# Patient Record
Sex: MALE | Race: WHITE | NOT HISPANIC OR LATINO | Employment: OTHER | URBAN - METROPOLITAN AREA
[De-identification: names, ages, dates, MRNs, and addresses within clinical notes are randomized per-mention and may not be internally consistent; named-entity substitution may affect disease eponyms.]

---

## 2017-01-04 RX ORDER — PREGABALIN 100 MG/1
100 CAPSULE ORAL 3 TIMES DAILY
Qty: 270 CAPSULE | Refills: 1 | Status: SHIPPED | OUTPATIENT
Start: 2017-01-04 | End: 2017-04-11 | Stop reason: SDUPTHER

## 2017-01-04 NOTE — TELEPHONE ENCOUNTER
Medication Refill Request    Date of phone call: 1/3/17    Medication being requested: Lyrica 100 mg sig: 3xday  Qty: 270    Date of last visit: 11/21/16    Date of last refill: 9/20/16    COURTNEY up to date?: 11/18/16    Next Follow up?: 1/5/17    Any new pertinent information? (i.e, new medication allergies, new use of medications, change in patient's health or condition, non-compliance or inconsistency with prescribing agreement?): patient wants sent to Mail in order

## 2017-01-05 ENCOUNTER — OFFICE VISIT (OUTPATIENT)
Dept: PAIN MEDICINE | Facility: CLINIC | Age: 72
End: 2017-01-05

## 2017-01-05 VITALS
WEIGHT: 225 LBS | HEART RATE: 80 BPM | HEIGHT: 71 IN | BODY MASS INDEX: 31.5 KG/M2 | DIASTOLIC BLOOD PRESSURE: 91 MMHG | TEMPERATURE: 97.9 F | OXYGEN SATURATION: 96 % | RESPIRATION RATE: 16 BRPM | SYSTOLIC BLOOD PRESSURE: 144 MMHG

## 2017-01-05 DIAGNOSIS — G89.29 OTHER CHRONIC PAIN: Primary | ICD-10-CM

## 2017-01-05 DIAGNOSIS — M51.36 DEGENERATION OF INTERVERTEBRAL DISC OF LUMBAR REGION: ICD-10-CM

## 2017-01-05 DIAGNOSIS — Z79.899 ENCOUNTER FOR LONG-TERM CURRENT USE OF HIGH RISK MEDICATION: ICD-10-CM

## 2017-01-05 DIAGNOSIS — M48.061 SPINAL STENOSIS OF LUMBAR REGION: ICD-10-CM

## 2017-01-05 DIAGNOSIS — Z97.8 PRESENCE OF INTRATHECAL PUMP: ICD-10-CM

## 2017-01-05 PROCEDURE — 99214 OFFICE O/P EST MOD 30 MIN: CPT | Performed by: NURSE PRACTITIONER

## 2017-01-05 PROCEDURE — 62369 ANAL SP INF PMP W/REPRG&FILL: CPT | Performed by: NURSE PRACTITIONER

## 2017-01-05 NOTE — PROGRESS NOTES
CHIEF COMPLAINT  Since 11/21/16 office visit/pump refill,Pt states LBP has increased moderately with cold weather.  Pt was hospitalized at [facility] from 12/24/16 to 12/27/16 due to being unresponsive on 12/24/16;Exams reportedly ruled out M.I.,Stroke,blood clots-no Dx confirmed    Subjective   Tay CELIA Geigre is a 71 y.o. male  who presents to the office for follow-up.He has a history of chronic low back pain. He is here today for refill of IT Prialt.  He has been stable with his current regimen of Prialt for many years.  He does sparingly supplement with hydrocodone, this is usually only a few times each week.      The patient was hospitalized recently with acute unresponsive episode and confusion/altered mental status.  MI, acute CVA, seizures, infection ruled out.  He has improved, however the confusion has continued.         I have reviewed the hospital course at [facility] 12-24-16 - 12-27-16  Hospital Course:  71-year-old male with a past medical history of chronic pain on Ziconotide intrathecal pump for chronic back pain, hypertension who presented on December 24 with complaints of change in mental status and increased confusion worse than his baseline. His hospital course is summarized as below.    AMS  - TIA vs post-ictal vs pain-pump (with Prialt) vs infectious  - The workup was negative and there was no acute findings that would explain his change in mental status. MRI did not show any acute changes. MRA with some vascular changes but most likely not responsible for his current issues (see above for details). He underwent an EEG which was unremarkable as well. Patient is back to near baseline although his wife does report he has chronic short term memory issues as well.     Vit B12 def: Level was 130's. Possibly could explain some other more chronic Issues. Doubt this is the problem with acute issue as it resolved quickly. Starting on IM vit b12 injections and recommend follow up with pcp  to continue at home.      Acute on chronic kidney disease: Patient's creatinine on admission was 1.5. Per the wife is been around 1.4 however is currently 1.1. Is it possible that this was related to medication setting of renal dysfunction now improving? Recommend output follow up.      HTN - uncontrolled at times  - Blood pressure still not well controlled. Increased Norvasc to 10 milligrams. and increased hydralazine to 50 milligrams t.i.d.     Chronic back pain - has pain pump with Prialt - no changes acutely. Outpatient follow-up with his primary pain management physician.     HLD - lipids pending  - defer to pcp.     I spent greater than 30 minutes in the preparation of this discharge summary.  Signed:  Dano Sheppard MD  12/27/2016  2:10 PM        Back Pain   This is a chronic problem. The current episode started more than 1 year ago. The problem occurs constantly. The problem is unchanged. The pain is present in the lumbar spine. The quality of the pain is described as aching. The pain radiates to the left foot. The pain is at a severity of 8/10. The pain is the same all the time. Exacerbated by: cold weather, prolonged walking. Pertinent negatives include no bladder incontinence, bowel incontinence, chest pain, dysuria, headaches or numbness. Risk factors include lack of exercise and sedentary lifestyle. Treatments tried: IT pump with Prialt, Lyrica. The treatment provided mild relief.        PEG Assessment   What number best describes your pain on average in the past week?6  What number best describes how, during the past week, pain has interfered with your enjoyment of life?9  What number best describes how, during the past week, pain has interfered with your general activity?  9      The following portions of the patient's history were reviewed and updated as appropriate: allergies, current medications, past family history, past medical history, past social history, past surgical  "history and problem list.    Review of Systems   Constitutional: Positive for activity change (decreased). Negative for appetite change.   Eyes: Positive for visual disturbance.   Respiratory: Negative for chest tightness and shortness of breath.    Cardiovascular: Negative for chest pain.   Gastrointestinal: Negative for bowel incontinence, constipation, diarrhea and nausea.   Genitourinary: Negative for bladder incontinence, difficulty urinating and dysuria.   Musculoskeletal: Positive for back pain.   Neurological: Negative for dizziness, light-headedness, numbness and headaches.   Psychiatric/Behavioral: Positive for confusion and decreased concentration (memory deficit).       Vitals:    01/05/17 1141   BP: 144/91   Pulse: 80   Resp: 16   Temp: 97.9 °F (36.6 °C)   SpO2: 96%   Weight: 225 lb (102 kg)   Height: 71\" (180.3 cm)   PainSc: 8  Comment: LBP from center to L side ranges from1-8/10   PainLoc: Back         Objective   Physical Exam   Constitutional: He is oriented to person, place, and time. He appears well-developed and well-nourished.   HENT:   Head: Normocephalic.   Eyes: Conjunctivae are normal.   Cardiovascular: Normal rate.    Pulmonary/Chest: Effort normal.   Neurological: He is alert and oriented to person, place, and time.   Skin: Skin is warm and dry.   Psychiatric: He has a normal mood and affect. His behavior is normal. Judgment and thought content normal.   Nursing note and vitals reviewed.      Intrathecal pump was interrogated in office today. Results are in chart.      The patient is currently at a dose of 10.2 mcg of Prialt per day.       Under sterile technique, the pump was accessed using a [employer] proprietary refill kit. The volume was withdrawn from the pump. The expected residual volume of the pump was 4.1 ml. The actual residual volume of the pump was 5.3 ml.      The pump was then refilled with 20 ml of Prialt at a concentration of 25 mcg/ml.  Dose was decreased to " 9mcg/day      Patient tolerated procedure well. Minimal bleeding was noted. Pump site remains intact with no evidence of erythema or drainage.      Patient will return for pump refill when due or sooner if needed.     Assessment/Plan   Tay was seen today for back pain.    Diagnoses and all orders for this visit:    Other chronic pain    Presence of intrathecal pump    Spinal stenosis of lumbar region    Degeneration of intervertebral disc of lumbar region    Encounter for long-term current use of high risk medication        --- Decrease Prialt by 10% today to 9 mcg/day. If confusion improves consider Prialt as culprit for mental status changes.  If not improved, consider reducing dose by another 10% to further rule out Prialt as cause for confusion.  If symptoms remain unchanged at that point, it would be unlikely that the Prialt is the cause of the confusion.    --- Check on status of pump dye study  --- does not need refill today of hydrocodone. Patient appears stable with current regimen. No adverse effects. Regarding continuation of opioids, there is no evidence of aberrant behavior or any red flags.  The patient continues with appropriate response to opioid therapy. ADL's remain intact by self.   --- needs UDS/ODT next office visit  --- Follow-up 7-10 days         COURTNEY REPORT    As part of the patient's treatment plan, I am prescribing controlled substances. The patient has been made aware of appropriate use of such medications, including potential risk of somnolence, limited ability to drive and/or work safely, and the potential for dependence or overdose. It has also bee made clear that these medications are for use by this patient only, without concomitant use of alcohol or other substances unless prescribed.     Patient has completed prescribing agreement detailing terms of continued prescribing of controlled substances, including monitoring COURTNEY reports, urine drug screening, and pill counts if  necessary. The patient is aware that inappropriate use will results in cessation of prescribing such medications.    COURTNEY report has been reviewed and scanned into the patient's chart.    Date of last COURTNEY : 1-4-2017    History and physical exam exhibit continued safe and appropriate use of controlled substances.      EMR Dragon/Transcription disclaimer:   Much of this encounter note is an electronic transcription/translation of spoken language to printed text. The electronic translation of spoken language may permit erroneous, or at times, nonsensical words or phrases to be inadvertently transcribed; Although I have reviewed the note for such errors, some may still exist.

## 2017-01-05 NOTE — MR AVS SNAPSHOT
Tay Geiger   1/5/2017 11:00 AM   Office Visit    Dept Phone:  [phone]   Encounter #:  [id number]    Provider:  BENIGNO Haji   Department:  [facility] PAIN MANAGEMENT                Your Full Care Plan              Your Updated Medication List          This list is accurate as of: 1/5/17 12:24 PM.  Always use your most recent med list.                amitriptyline 50 MG tablet   Commonly known as:  ELAVIL       amLODIPine 5 MG tablet   Commonly known as:  NORVASC       aspirin 81 MG tablet       cetirizine 10 MG tablet   Commonly known as:  zyrTEC       citalopram 20 MG tablet   Commonly known as:  CeleXA   TAKE ONE TABLET BY MOUTH DAILY AS DIRECTED       DULoxetine 60 MG capsule   Commonly known as:  CYMBALTA       hydrALAZINE 25 MG tablet   Commonly known as:  APRESOLINE       HYDROcodone-acetaminophen  MG per tablet   Commonly known as:  NORCO   Take 1 tablet by mouth Daily As Needed for moderate pain (4-6).       Melatonin 3 MG capsule       metoclopramide 10 MG tablet   Commonly known as:  REGLAN       NUVIGIL 250 MG tablet   Generic drug:  Armodafinil       omeprazole 20 MG capsule   Commonly known as:  priLOSEC       polyethylene glycol powder   Commonly known as:  MIRALAX       pregabalin 100 MG capsule   Commonly known as:  LYRICA   Take 1 capsule by mouth 3 (Three) Times a Day.       simvastatin 40 MG tablet   Commonly known as:  ZOCOR       tiZANidine 4 MG tablet   Commonly known as:  ZANAFLEX   Take 2 tablets by mouth 4 (Four) Times a Day As Needed (#720 3 month supply). For muscle spasms       * VITAMIN D PO       * Vitamin D 2000 UNITS capsule       ziconotide (PF) 500 MCG/20ML injection   Commonly known as:  PRIALT       * Notice:  This list has 2 medication(s) that are the same as other medications prescribed for you. Read the directions carefully, and ask your doctor or other care provider to review them with you.            You  Were Diagnosed With        Codes Comments    Other chronic pain    -  Primary ICD-10-CM: G89.29  ICD-9-CM: 338.29     Presence of intrathecal pump     ICD-10-CM: Z96.89  ICD-9-CM: V45.89     Spinal stenosis of lumbar region     ICD-10-CM: M48.06  ICD-9-CM: 724.02     Degeneration of intervertebral disc of lumbar region     ICD-10-CM: M51.36  ICD-9-CM: 722.52     Encounter for long-term current use of high risk medication     ICD-10-CM: Z79.899  ICD-9-CM: V58.69       Instructions     None    Patient Instructions History      Upcoming Appointments     Visit Type Date Time Department    OFFICE VISIT 2017 11:00 AM [facility]    OFFICE VISIT 2017 12:40 PM [facility]      [employer] Signup     [facility] [employer] allows you to send messages to your doctor, view your test results, renew your prescriptions, schedule appointments, and more. To sign up, go to [employer] and click on the Sign Up Now link in the New User? box. Enter your [employer] Activation Code exactly as it appears below along with the last four digits of your Social Security Number and your Date of Birth () to complete the sign-up process. If you do not sign up before the expiration date, you must request a new code.    Onevest Activation Code: [email or web address]  Expires: 2017 12:24 PM    If you have questions, you can email [email or web address] or call [phone] to talk to our One[employer] staff. Remember, Onevest is NOT to be used for urgent needs. For medical emergencies, dial 911.               Other Info from Your Visit           Your Appointments     2017 12:40 PM EST   Office Visit with BENIGNO Haji   [facility] (--)    [address], [address] [address] [phone]           Arrive 15 minutes prior to appointment. Arrive 30 minutes prior to your appointment. Bring insurance card, photo ID and copay.              Allergies   "   Lidocaine  Other (See Comments)    Morphine        Reason for Visit     Back Pain           Vital Signs     Blood Pressure Pulse Temperature Respirations Height Weight    144/91 80 97.9 °F (36.6 °C) 16 71\" (180.3 cm) 225 lb (102 kg)    Oxygen Saturation Body Mass Index Smoking Status             96% 31.38 kg/m2 Former Smoker         Problems and Diagnoses Noted     Chronic pain    Degeneration of lumbar or lumbosacral intervertebral disc    Presence of intrathecal pump    Narrowing of spinal canal    Encounter for long-term current use of high risk medication            "

## 2017-01-13 ENCOUNTER — OFFICE VISIT (OUTPATIENT)
Dept: PAIN MEDICINE | Facility: CLINIC | Age: 72
End: 2017-01-13

## 2017-01-13 VITALS
HEIGHT: 71 IN | RESPIRATION RATE: 16 BRPM | OXYGEN SATURATION: 96 % | WEIGHT: 223 LBS | TEMPERATURE: 98.2 F | BODY MASS INDEX: 31.22 KG/M2 | SYSTOLIC BLOOD PRESSURE: 182 MMHG | HEART RATE: 88 BPM | DIASTOLIC BLOOD PRESSURE: 112 MMHG

## 2017-01-13 DIAGNOSIS — Z97.8 PRESENCE OF INTRATHECAL PUMP: ICD-10-CM

## 2017-01-13 DIAGNOSIS — M48.061 SPINAL STENOSIS OF LUMBAR REGION: ICD-10-CM

## 2017-01-13 DIAGNOSIS — G89.29 OTHER CHRONIC PAIN: Primary | ICD-10-CM

## 2017-01-13 DIAGNOSIS — M51.36 DEGENERATION OF INTERVERTEBRAL DISC OF LUMBAR REGION: ICD-10-CM

## 2017-01-13 DIAGNOSIS — M51.26 DISPLACEMENT OF LUMBAR INTERVERTEBRAL DISC WITHOUT MYELOPATHY: ICD-10-CM

## 2017-01-13 DIAGNOSIS — M54.16 LUMBAR RADICULOPATHY: ICD-10-CM

## 2017-01-13 PROCEDURE — 62368 ANALYZE SP INF PUMP W/REPROG: CPT | Performed by: NURSE PRACTITIONER

## 2017-01-13 PROCEDURE — 99213 OFFICE O/P EST LOW 20 MIN: CPT | Performed by: NURSE PRACTITIONER

## 2017-01-13 NOTE — MR AVS SNAPSHOT
Tay Geiger   1/13/2017 12:40 PM   Office Visit    Dept Phone:  [phone]   Encounter #:  [id number]    Provider:  BENIGNO Haji   Department:  [facility] PAIN MANAGEMENT                Your Full Care Plan              Your Updated Medication List          This list is accurate as of: 1/13/17  1:48 PM.  Always use your most recent med list.                amitriptyline 50 MG tablet   Commonly known as:  ELAVIL       amLODIPine 5 MG tablet   Commonly known as:  NORVASC       aspirin 81 MG tablet       cetirizine 10 MG tablet   Commonly known as:  zyrTEC       citalopram 20 MG tablet   Commonly known as:  CeleXA   TAKE ONE TABLET BY MOUTH DAILY AS DIRECTED       DULoxetine 60 MG capsule   Commonly known as:  CYMBALTA       hydrALAZINE 25 MG tablet   Commonly known as:  APRESOLINE       HYDROcodone-acetaminophen  MG per tablet   Commonly known as:  NORCO   Take 1 tablet by mouth Daily As Needed for moderate pain (4-6).       Melatonin 3 MG capsule       metoclopramide 10 MG tablet   Commonly known as:  REGLAN       NUVIGIL 250 MG tablet   Generic drug:  Armodafinil       omeprazole 20 MG capsule   Commonly known as:  priLOSEC       polyethylene glycol powder   Commonly known as:  MIRALAX       pregabalin 100 MG capsule   Commonly known as:  LYRICA   Take 1 capsule by mouth 3 (Three) Times a Day.       simvastatin 40 MG tablet   Commonly known as:  ZOCOR       tiZANidine 4 MG tablet   Commonly known as:  ZANAFLEX   Take 2 tablets by mouth 4 (Four) Times a Day As Needed (#720 3 month supply). For muscle spasms       * VITAMIN D PO       * Vitamin D 2000 UNITS capsule       ziconotide (PF) 500 MCG/20ML injection   Commonly known as:  PRIALT       * Notice:  This list has 2 medication(s) that are the same as other medications prescribed for you. Read the directions carefully, and ask your doctor or other care provider to review them with you.            You  "Were Diagnosed With        Codes Comments    Other chronic pain    -  Primary ICD-10-CM: G89.29  ICD-9-CM: 338.29     Spinal stenosis of lumbar region     ICD-10-CM: M48.06  ICD-9-CM: 724.02     Displacement of lumbar intervertebral disc without myelopathy     ICD-10-CM: M51.26  ICD-9-CM: 722.10     Degeneration of intervertebral disc of lumbar region     ICD-10-CM: M51.36  ICD-9-CM: 722.52     Lumbar radiculopathy     ICD-10-CM: M54.16  ICD-9-CM: 724.4     Presence of intrathecal pump     ICD-10-CM: Z96.89  ICD-9-CM: V45.89       Instructions     None    Patient Instructions History      Upcoming Appointments     Visit Type Date Time Department    OFFICE VISIT 2017 12:40 PM [facility]      [employer] Signup     [facility] [employer] allows you to send messages to your doctor, view your test results, renew your prescriptions, schedule appointments, and more. To sign up, go to [employer] and click on the Sign Up Now link in the New User? box. Enter your [employer] Activation Code exactly as it appears below along with the last four digits of your Social Security Number and your Date of Birth () to complete the sign-up process. If you do not sign up before the expiration date, you must request a new code.    [employer] Activation Code: [email or web address]  Expires: 2017 12:24 PM    If you have questions, you can email [email or web address] or call [phone] to talk to our [employer] staff. Remember, [employer] is NOT to be used for urgent needs. For medical emergencies, dial 911.               Other Info from Your Visit           Allergies     Lidocaine  Other (See Comments)    Morphine        Reason for Visit     Back Pain           Vital Signs     Blood Pressure Pulse Temperature Respirations Height Weight    182/112 88 98.2 °F (36.8 °C) 16 71\" (180.3 cm) 223 lb (101 kg)    Oxygen Saturation Body Mass Index Smoking Status             96% 31.1 kg/m2 Former Smoker         Problems and " Diagnoses Noted     Chronic pain    Degeneration of lumbar or lumbosacral intervertebral disc    Bulging disc of backbone    Lumbar nerve root disorder    Presence of intrathecal pump    Narrowing of spinal canal

## 2017-01-13 NOTE — PROGRESS NOTES
CHIEF COMPLAINT  Pt reportedly has had increase in LBP since 1/5/17 office visit,and has been taking hydrocodone (2) BID over the last week.  Pt's level of confusion/memory deficit reportedly has not improved as well.    Subjective   Tay Geiger is a 71 y.o. male  who presents to the office for follow-up.He has a history of chronic low back pain.  At the previous office visit his IT Prialt dose was decreased second to confusion and hospitalization with altered mental status.  Since that time the patient reports increase in back pain, has been taking more breakthrough hydrocodone since that time, reports no improvement in confusion.  He will be seeing a neurologist on Monday.      BP high today, reports that he took his blood pressure medication late today.  Denies any chest pain, SOA, visual changes, HA.      Pump dye study has still not been authorized.      Back Pain   This is a chronic problem. The current episode started more than 1 year ago. The problem occurs constantly. The problem is unchanged. The pain is present in the lumbar spine. The quality of the pain is described as aching. The pain radiates to the left foot. The pain is at a severity of 7/10. The pain is the same all the time. Exacerbated by: cold weather, prolonged walking. Pertinent negatives include no bladder incontinence, bowel incontinence, chest pain, dysuria, headaches or numbness. Risk factors include lack of exercise and sedentary lifestyle. Treatments tried: IT pump with Prialt, Lyrica. The treatment provided mild relief.      PEG Assessment   What number best describes your pain on average in the past week?8  What number best describes how, during the past week, pain has interfered with your enjoyment of life?9  What number best describes how, during the past week, pain has interfered with your general activity?  9    The following portions of the patient's history were reviewed and updated as appropriate: allergies, current  "medications, past family history, past medical history, past social history, past surgical history and problem list.    Review of Systems   Constitutional: Negative for activity change and appetite change.   Respiratory: Negative for apnea and shortness of breath.    Cardiovascular: Negative for chest pain.   Gastrointestinal: Negative for bowel incontinence, constipation, diarrhea and nausea.   Genitourinary: Negative for bladder incontinence, difficulty urinating and dysuria.   Musculoskeletal: Positive for back pain.   Neurological: Negative for dizziness, numbness and headaches.   Psychiatric/Behavioral: Positive for confusion and sleep disturbance. Negative for suicidal ideas.     Vitals:    01/13/17 1253   BP: (!) 182/112   Pulse: 88   Resp: 16   Temp: 98.2 °F (36.8 °C)   SpO2: 96%   Weight: 223 lb (101 kg)   Height: 71\" (180.3 cm)   PainSc: 7  Comment: LBP ranges from 2-8/10   PainLoc: Back     Objective   Physical Exam   Constitutional: He is oriented to person, place, and time. He appears well-developed and well-nourished.   HENT:   Head: Normocephalic.   Eyes: Conjunctivae are normal.   Cardiovascular: Normal rate.    Pulmonary/Chest: Effort normal.   Neurological: He is alert and oriented to person, place, and time.   Skin: Skin is warm and dry.   Psychiatric: He has a normal mood and affect. His behavior is normal. Judgment and thought content normal.   Nursing note and vitals reviewed.    Assessment/Plan   Tay was seen today for back pain.    Diagnoses and all orders for this visit:    Other chronic pain    Spinal stenosis of lumbar region    Displacement of lumbar intervertebral disc without myelopathy    Degeneration of intervertebral disc of lumbar region    Lumbar radiculopathy    Presence of intrathecal pump      --- Decrease Prialt dose by 10% to 8 mcg /day.  If confusion improves consider Prialt as culprit for mental status changes.  If symptoms remain unchanged at that point, it would be " unlikely that the Prialt is the cause of the confusion.   --- does not need refill today of hydrocodone. Patient appears stable with current regimen. No adverse effects. Regarding continuation of opioids, there is no evidence of aberrant behavior or any red flags. The patient continues with appropriate response to opioid therapy. ADL's remain intact by self.   --- needs UDS/ODT next office visit  --- Follow-up 7-10 days         COURTNEY REPORT    As part of the patient's treatment plan, I am prescribing controlled substances. The patient has been made aware of appropriate use of such medications, including potential risk of somnolence, limited ability to drive and/or work safely, and the potential for dependence or overdose. It has also bee made clear that these medications are for use by this patient only, without concomitant use of alcohol or other substances unless prescribed.     Patient has completed prescribing agreement detailing terms of continued prescribing of controlled substances, including monitoring COURTNEY reports, urine drug screening, and pill counts if necessary. The patient is aware that inappropriate use will results in cessation of prescribing such medications.    COURTNEY report has been reviewed and scanned into the patient's chart.    Date of last COURTNEY : 1-    History and physical exam exhibit continued safe and appropriate use of controlled substances.    EMR Dragon/Transcription disclaimer:   Much of this encounter note is an electronic transcription/translation of spoken language to printed text. The electronic translation of spoken language may permit erroneous, or at times, nonsensical words or phrases to be inadvertently transcribed; Although I have reviewed the note for such errors, some may still exist.

## 2017-01-25 ENCOUNTER — OFFICE VISIT (OUTPATIENT)
Dept: PAIN MEDICINE | Facility: CLINIC | Age: 72
End: 2017-01-25

## 2017-01-25 ENCOUNTER — RESULTS ENCOUNTER (OUTPATIENT)
Dept: PAIN MEDICINE | Facility: CLINIC | Age: 72
End: 2017-01-25

## 2017-01-25 VITALS
DIASTOLIC BLOOD PRESSURE: 113 MMHG | WEIGHT: 230 LBS | HEART RATE: 76 BPM | RESPIRATION RATE: 18 BRPM | OXYGEN SATURATION: 97 % | TEMPERATURE: 97.6 F | SYSTOLIC BLOOD PRESSURE: 187 MMHG | BODY MASS INDEX: 32.2 KG/M2 | HEIGHT: 71 IN

## 2017-01-25 DIAGNOSIS — G89.29 OTHER CHRONIC PAIN: Primary | ICD-10-CM

## 2017-01-25 DIAGNOSIS — M48.061 SPINAL STENOSIS OF LUMBAR REGION: ICD-10-CM

## 2017-01-25 DIAGNOSIS — M51.36 DEGENERATION OF INTERVERTEBRAL DISC OF LUMBAR REGION: ICD-10-CM

## 2017-01-25 DIAGNOSIS — Z97.8 PRESENCE OF INTRATHECAL PUMP: ICD-10-CM

## 2017-01-25 DIAGNOSIS — G89.29 OTHER CHRONIC PAIN: ICD-10-CM

## 2017-01-25 PROCEDURE — 99214 OFFICE O/P EST MOD 30 MIN: CPT | Performed by: NURSE PRACTITIONER

## 2017-01-25 PROCEDURE — 62368 ANALYZE SP INF PUMP W/REPROG: CPT | Performed by: NURSE PRACTITIONER

## 2017-01-25 RX ORDER — HYDROCODONE BITARTRATE AND ACETAMINOPHEN 10; 325 MG/1; MG/1
1 TABLET ORAL DAILY PRN
Qty: 30 TABLET | Refills: 0 | Status: SHIPPED | OUTPATIENT
Start: 2017-01-25 | End: 2017-05-25 | Stop reason: SDUPTHER

## 2017-01-25 RX ORDER — CLOPIDOGREL BISULFATE 75 MG/1
75 TABLET ORAL DAILY
COMMUNITY

## 2017-01-25 NOTE — PROGRESS NOTES
CHIEF COMPLAINT  Back Pain, patient fell on Saturday and his wife states that his legs gave out on him.    Subjective   Tay Geiger is a 71 y.o. male  who presents to the office for follow-up.He has a history of chronic low back pain.    At the previous office visit his IT Prialt dose was decreased.  This was the second decrease since a recent hospitalization with altered mental status in attempt to rule out Prialt as culprit for these symptoms.  Since that time the patient reports increase in back pain, has been taking more breakthrough hydrocodone since that time, reports no improvement in confusion symptoms. He has also seen a neurologist since last visit.  Neurologist is now saying that he believes that he had a TIA, was started on Plavix.  He has also started B12 injections.      Prior to decreasing the prialt dose he had been stable with a regimen of Prialt 10 mcg/day. No adverse effects and pain was well controlled with this regimen.  He only required breakthrough medication on a very sparing basis.      BP high today. Denies any chest pain, SOA, visual changes, HA.     Back Pain   This is a chronic problem. The current episode started more than 1 year ago. The problem occurs constantly. The problem is unchanged. The pain is present in the lumbar spine. The quality of the pain is described as aching. The pain radiates to the left foot. The pain is at a severity of 7/10. The pain is the same all the time. Exacerbated by: cold weather, prolonged walking. Associated symptoms include weakness. Pertinent negatives include no bladder incontinence, bowel incontinence, chest pain, dysuria, fever, headaches or numbness. Risk factors include lack of exercise and sedentary lifestyle. Treatments tried: IT pump with Prialt, Lyrica. The treatment provided moderate relief.      PEG Assessment   What number best describes your pain on average in the past week?7  What number best describes how, during the past week, pain  "has interfered with your enjoyment of life?10  What number best describes how, during the past week, pain has interfered with your general activity?  10    The following portions of the patient's history were reviewed and updated as appropriate: allergies, current medications, past family history, past medical history, past social history, past surgical history and problem list.    Review of Systems   Constitutional: Negative for chills and fever.   Respiratory: Negative for shortness of breath.    Cardiovascular: Negative for chest pain.   Gastrointestinal: Negative for bowel incontinence, constipation, diarrhea, nausea and vomiting.   Genitourinary: Negative for bladder incontinence, difficulty urinating and dysuria.   Musculoskeletal: Positive for back pain.   Neurological: Positive for weakness. Negative for dizziness, light-headedness, numbness and headaches.   Psychiatric/Behavioral: Positive for confusion. Negative for hallucinations, self-injury, sleep disturbance and suicidal ideas. The patient is not nervous/anxious.        Vitals:    01/25/17 1029   BP: (!) 187/113   Pulse: 76   Resp: 18   Temp: 97.6 °F (36.4 °C)   SpO2: 97%   Weight: 230 lb (104 kg)   Height: 71\" (180.3 cm)   PainSc:   8   PainLoc: Back       Objective   Physical Exam   Constitutional: He is oriented to person, place, and time. He appears well-developed and well-nourished.   HENT:   Head: Normocephalic.   Eyes: Conjunctivae are normal.   Cardiovascular: Normal rate.    Pulmonary/Chest: Effort normal.   Neurological: He is alert and oriented to person, place, and time.   Skin: Skin is warm and dry.   Psychiatric: He has a normal mood and affect. His behavior is normal. Judgment and thought content normal.   Nursing note and vitals reviewed.      Assessment/Plan   Tay was seen today for back pain.    Diagnoses and all orders for this visit:    Other chronic pain    Spinal stenosis of lumbar region    Degeneration of intervertebral disc " of lumbar region    Presence of intrathecal pump    Other orders  -     HYDROcodone-acetaminophen (NORCO)  MG per tablet; Take 1 tablet by mouth Daily As Needed for moderate pain (4-6).      --- Increase Prialt back to 10 mcg/day  --- Must follow up with PCP in regards to HTN  --- Refill Hydrocodone.  Patient appears stable with current regimen. No adverse effects. Regarding continuation of opioids, there is no evidence of aberrant behavior or any red flags.  The patient continues with appropriate response to opioid therapy. ADL's remain intact by self.   --- Routine ODT in office today as part of monitoring requirements for controlled substances.  This specimen will be sent to [facility] laboratory for confirmation.   (LAST DOSE REPORTED 4 PM Yesterday)  --- Follow-up pump refill in 1 month          COURTNEY REPORT    As part of the patient's treatment plan, I am prescribing controlled substances. The patient has been made aware of appropriate use of such medications, including potential risk of somnolence, limited ability to drive and/or work safely, and the potential for dependence or overdose. It has also bee made clear that these medications are for use by this patient only, without concomitant use of alcohol or other substances unless prescribed.     Patient has completed prescribing agreement detailing terms of continued prescribing of controlled substances, including monitoring COURTNEY reports, urine drug screening, and pill counts if necessary. The patient is aware that inappropriate use will results in cessation of prescribing such medications.    COURTNEY report has been reviewed and scanned into the patient's chart.    Date of last COURTNEY : 1-    History and physical exam exhibit continued safe and appropriate use of controlled substances.    EMR Dragon/Transcription disclaimer:   Much of this encounter note is an electronic transcription/translation of spoken language to printed text. The  electronic translation of spoken language may permit erroneous, or at times, nonsensical words or phrases to be inadvertently transcribed; Although I have reviewed the note for such errors, some may still exist.

## 2017-01-25 NOTE — MR AVS SNAPSHOT
Tay Geiger   1/25/2017 10:20 AM   Office Visit    Dept Phone:  [phone]   Encounter #:  [id number]    Provider:  BENIGNO Haji   Department:  [facility] PAIN MANAGEMENT                Your Full Care Plan              Where to Get Your Medications      You can get these medications from any pharmacy     Bring a paper prescription for each of these medications     HYDROcodone-acetaminophen  MG per tablet            Your Updated Medication List          This list is accurate as of: 1/25/17 11:12 AM.  Always use your most recent med list.                amitriptyline 50 MG tablet   Commonly known as:  ELAVIL       amLODIPine 5 MG tablet   Commonly known as:  NORVASC       aspirin 81 MG tablet       cetirizine 10 MG tablet   Commonly known as:  zyrTEC       citalopram 20 MG tablet   Commonly known as:  CeleXA   TAKE ONE TABLET BY MOUTH DAILY AS DIRECTED       clopidogrel 75 MG tablet   Commonly known as:  PLAVIX       DULoxetine 60 MG capsule   Commonly known as:  CYMBALTA       hydrALAZINE 25 MG tablet   Commonly known as:  APRESOLINE       HYDROcodone-acetaminophen  MG per tablet   Commonly known as:  NORCO   Take 1 tablet by mouth Daily As Needed for moderate pain (4-6).       Melatonin 3 MG capsule       metoclopramide 10 MG tablet   Commonly known as:  REGLAN       NUVIGIL 250 MG tablet   Generic drug:  Armodafinil       omeprazole 20 MG capsule   Commonly known as:  priLOSEC       polyethylene glycol powder   Commonly known as:  MIRALAX       pregabalin 100 MG capsule   Commonly known as:  LYRICA   Take 1 capsule by mouth 3 (Three) Times a Day.       simvastatin 40 MG tablet   Commonly known as:  ZOCOR       tiZANidine 4 MG tablet   Commonly known as:  ZANAFLEX   Take 2 tablets by mouth 4 (Four) Times a Day As Needed (#720 3 month supply). For muscle spasms       * VITAMIN D PO       * Vitamin D 2000 UNITS capsule       ziconotide (PF) 500  "MCG/20ML injection   Commonly known as:  PRIALT       * Notice:  This list has 2 medication(s) that are the same as other medications prescribed for you. Read the directions carefully, and ask your doctor or other care provider to review them with you.            You Were Diagnosed With        Codes Comments    Other chronic pain    -  Primary ICD-10-CM: G89.29  ICD-9-CM: 338.29     Spinal stenosis of lumbar region     ICD-10-CM: M48.06  ICD-9-CM: 724.02     Degeneration of intervertebral disc of lumbar region     ICD-10-CM: M51.36  ICD-9-CM: 722.52     Presence of intrathecal pump     ICD-10-CM: Z96.89  ICD-9-CM: V45.89       Instructions     None    Patient Instructions History      Upcoming Appointments     Visit Type Date Time Department    OFFICE VISIT 2017 10:20 AM [facility]      [employer] Signup     [facility] [employer] allows you to send messages to your doctor, view your test results, renew your prescriptions, schedule appointments, and more. To sign up, go to "[employer] and click on the Sign Up Now link in the New User? box. Enter your [employer] Activation Code exactly as it appears below along with the last four digits of your Social Security Number and your Date of Birth () to complete the sign-up process. If you do not sign up before the expiration date, you must request a new code.    [employer] Activation Code: [email or web address]  Expires: 2017 11:12 AM    If you have questions, you can email [email or web address] or call [phone] to talk to our [employer] staff. Remember, [employer] is NOT to be used for urgent needs. For medical emergencies, dial 911.               Other Info from Your Visit           Allergies     Lidocaine  Other (See Comments)    Morphine        Reason for Visit     Back Pain           Vital Signs     Blood Pressure Pulse Temperature Respirations Height Weight    187/113 76 97.6 °F (36.4 °C) 18 71\" (180.3 cm) 230 lb (104 kg)    Oxygen Saturation " Body Mass Index Smoking Status             97% 32.08 kg/m2 Former Smoker         Problems and Diagnoses Noted     Chronic pain    Degeneration of lumbar or lumbosacral intervertebral disc    Presence of intrathecal pump    Narrowing of spinal canal

## 2017-02-23 ENCOUNTER — OFFICE VISIT (OUTPATIENT)
Dept: PAIN MEDICINE | Facility: CLINIC | Age: 72
End: 2017-02-23

## 2017-02-23 VITALS
SYSTOLIC BLOOD PRESSURE: 180 MMHG | BODY MASS INDEX: 32.06 KG/M2 | HEIGHT: 71 IN | RESPIRATION RATE: 16 BRPM | HEART RATE: 72 BPM | OXYGEN SATURATION: 96 % | DIASTOLIC BLOOD PRESSURE: 104 MMHG | WEIGHT: 229 LBS

## 2017-02-23 DIAGNOSIS — M48.061 SPINAL STENOSIS OF LUMBAR REGION: ICD-10-CM

## 2017-02-23 DIAGNOSIS — M51.36 DEGENERATION OF INTERVERTEBRAL DISC OF LUMBAR REGION: ICD-10-CM

## 2017-02-23 DIAGNOSIS — Z79.899 ENCOUNTER FOR LONG-TERM CURRENT USE OF HIGH RISK MEDICATION: ICD-10-CM

## 2017-02-23 DIAGNOSIS — Z97.8 PRESENCE OF INTRATHECAL PUMP: ICD-10-CM

## 2017-02-23 DIAGNOSIS — G89.29 OTHER CHRONIC PAIN: Primary | ICD-10-CM

## 2017-02-23 PROCEDURE — 62369 ANAL SP INF PMP W/REPRG&FILL: CPT | Performed by: NURSE PRACTITIONER

## 2017-02-23 NOTE — PROGRESS NOTES
CHIEF COMPLAINT  Pt states L sided LBP has moderately increased since last refill.  Hydrocodone has been taken less however    Subjective   Tay Geiger is a 71 y.o. male  who presents to the office for follow-up.He has a history of chronic low back pain.  He presents for IT pump refill.  At the previous office visit his Prialt dose was increased back to previous regimen of 10mcg/day.  He does report improvement in his back pain with this regimen, his wife reports that he has been asking for less breakthrough pain medication over the last month since the Prialt was increased.  He is tolerating the dose well. No adverse reactions reported. No increase in confusion or change in mental status noted.      Back Pain   This is a chronic problem. The current episode started more than 1 year ago. The problem occurs constantly. The problem is unchanged. The pain is present in the lumbar spine. The quality of the pain is described as aching. The pain radiates to the left foot. The pain is at a severity of 6/10. The pain is the same all the time. Exacerbated by: cold weather, prolonged walking. Associated symptoms include weakness. Pertinent negatives include no bladder incontinence, bowel incontinence, chest pain, dysuria, fever, headaches or numbness. Risk factors include lack of exercise and sedentary lifestyle. Treatments tried: IT pump with Prialt, Lyrica. The treatment provided moderate relief.      PEG Assessment   What number best describes your pain on average in the past week?8  What number best describes how, during the past week, pain has interfered with your enjoyment of life?8  What number best describes how, during the past week, pain has interfered with your general activity?  8    The following portions of the patient's history were reviewed and updated as appropriate: allergies, current medications, past family history, past medical history, past social history, past surgical history and problem  "list.    Review of Systems   Constitutional: Negative for activity change, appetite change and fever.   Respiratory: Negative for apnea, chest tightness and shortness of breath.    Cardiovascular: Negative for chest pain.   Gastrointestinal: Negative for bowel incontinence, constipation, diarrhea and nausea.   Genitourinary: Negative for bladder incontinence, difficulty urinating and dysuria.   Musculoskeletal: Positive for back pain. Negative for gait problem.   Neurological: Positive for weakness. Negative for dizziness, light-headedness, numbness and headaches.   Psychiatric/Behavioral: Negative for sleep disturbance and suicidal ideas.       Vitals:    02/23/17 1102   BP: (!) 180/104   Pulse: 72   Resp: 16   SpO2: 96%   Weight: 229 lb (104 kg)   Height: 71\" (180.3 cm)   PainSc: 8  Comment: L sided LBP ranges from 2-8/10   PainLoc: Back       Objective   Physical Exam   Constitutional: He is oriented to person, place, and time. He appears well-developed and well-nourished.   HENT:   Head: Normocephalic.   Eyes: Conjunctivae are normal.   Cardiovascular: Normal rate.    Pulmonary/Chest: Effort normal.   Neurological: He is alert and oriented to person, place, and time.   Skin: Skin is warm and dry.   Psychiatric: He has a normal mood and affect. His behavior is normal. Judgment and thought content normal.   Nursing note and vitals reviewed.    Intrathecal pump was interrogated in office today. Results are in chart.     The patient is currently at a dose of 10 mcg of Prialt per day.      Under sterile technique, the pump was accessed using a [employer] proprietary refill kit. The volume was withdrawn from the pump. The expected residual volume of the pump was 1.7 ml. The actual residual volume of the pump was 2.8 ml.     The pump was then refilled with 20 ml of Prialt at a concentration of 25 mcg/ml.      Patient tolerated procedure well. Minimal bleeding was noted. Pump site remains intact with no evidence of " erythema or drainage.     Patient will return for pump refill when due or sooner if needed.     Assessment/Plan   Tay was seen today for back pain.    Diagnoses and all orders for this visit:    Other chronic pain    Spinal stenosis of lumbar region    Degeneration of intervertebral disc of lumbar region    Presence of intrathecal pump    Encounter for long-term current use of high risk medication      --- No changes made to IT Prialt dose today  --- Follow-up pump refill         COURTNEY REPORT    As part of the patient's treatment plan, I am prescribing controlled substances. The patient has been made aware of appropriate use of such medications, including potential risk of somnolence, limited ability to drive and/or work safely, and the potential for dependence or overdose. It has also bee made clear that these medications are for use by this patient only, without concomitant use of alcohol or other substances unless prescribed.     Patient has completed prescribing agreement detailing terms of continued prescribing of controlled substances, including monitoring COURTNEY reports, urine drug screening, and pill counts if necessary. The patient is aware that inappropriate use will results in cessation of prescribing such medications.    COURTNEY report has been reviewed and scanned into the patient's chart.    Date of last COURTNEY : 2-    History and physical exam exhibit continued safe and appropriate use of controlled substances.    EMR Dragon/Transcription disclaimer:   Much of this encounter note is an electronic transcription/translation of spoken language to printed text. The electronic translation of spoken language may permit erroneous, or at times, nonsensical words or phrases to be inadvertently transcribed; Although I have reviewed the note for such errors, some may still exist.

## 2017-03-21 RX ORDER — CITALOPRAM 20 MG/1
TABLET ORAL
Qty: 90 TABLET | Refills: 0 | Status: SHIPPED | OUTPATIENT
Start: 2017-03-21 | End: 2017-06-26 | Stop reason: SDUPTHER

## 2017-04-11 ENCOUNTER — OFFICE VISIT (OUTPATIENT)
Dept: PAIN MEDICINE | Facility: CLINIC | Age: 72
End: 2017-04-11

## 2017-04-11 VITALS
BODY MASS INDEX: 30.8 KG/M2 | DIASTOLIC BLOOD PRESSURE: 77 MMHG | SYSTOLIC BLOOD PRESSURE: 115 MMHG | TEMPERATURE: 97.6 F | OXYGEN SATURATION: 96 % | WEIGHT: 220 LBS | HEIGHT: 71 IN | RESPIRATION RATE: 16 BRPM | HEART RATE: 74 BPM

## 2017-04-11 DIAGNOSIS — M48.061 SPINAL STENOSIS OF LUMBAR REGION: ICD-10-CM

## 2017-04-11 DIAGNOSIS — G58.8 INTERCOSTAL NEURALGIA: ICD-10-CM

## 2017-04-11 DIAGNOSIS — M51.36 DEGENERATION OF INTERVERTEBRAL DISC OF LUMBAR REGION: ICD-10-CM

## 2017-04-11 DIAGNOSIS — G89.29 OTHER CHRONIC PAIN: Primary | ICD-10-CM

## 2017-04-11 DIAGNOSIS — M15.9 OSTEOARTHRITIS OF MULTIPLE JOINTS, UNSPECIFIED OSTEOARTHRITIS TYPE: ICD-10-CM

## 2017-04-11 DIAGNOSIS — M54.16 LUMBAR RADICULOPATHY: ICD-10-CM

## 2017-04-11 PROCEDURE — 62369 ANAL SP INF PMP W/REPRG&FILL: CPT | Performed by: NURSE PRACTITIONER

## 2017-04-11 PROCEDURE — 99213 OFFICE O/P EST LOW 20 MIN: CPT | Performed by: NURSE PRACTITIONER

## 2017-04-11 RX ORDER — PREGABALIN 100 MG/1
100 CAPSULE ORAL 3 TIMES DAILY
Qty: 90 CAPSULE | Refills: 2 | Status: SHIPPED | OUTPATIENT
Start: 2017-04-11 | End: 2017-04-25 | Stop reason: SDUPTHER

## 2017-04-11 NOTE — PROGRESS NOTES
CHIEF COMPLAINT  Pt here today for pump refill;LBP is basically unchanged    Subjective   Tay CELIA Geiger is a 71 y.o. male  who presents to the office for follow-up.He has a history of chronic low back pain.     He presents for IT pump refill. Currently at a dose of 10 mcg/day Prialt.  He also takes occasional hydrocodone for breakthrough pain, very sparing.  He tolerates this regimen well.  Reports moderate reduction in pain.  Denies adverse reactions.      Reporting increase in left sided intercostal pain.  Has had some benefit with some sort of topical gel in the past.      Back Pain   This is a chronic problem. The current episode started more than 1 year ago. The problem occurs constantly. The problem is unchanged. The pain is present in the lumbar spine. The quality of the pain is described as aching. The pain radiates to the left foot. The pain is at a severity of 8/10. The pain is the same all the time. Exacerbated by: cold weather, prolonged walking. Associated symptoms include weakness. Pertinent negatives include no bladder incontinence, bowel incontinence, chest pain, dysuria, fever, headaches or numbness. Risk factors include lack of exercise and sedentary lifestyle. Treatments tried: IT pump with Prialt, Lyrica. The treatment provided moderate relief.      PEG Assessment   What number best describes your pain on average in the past week?8  What number best describes how, during the past week, pain has interfered with your enjoyment of life?9  What number best describes how, during the past week, pain has interfered with your general activity?  9    The following portions of the patient's history were reviewed and updated as appropriate: allergies, current medications, past family history, past medical history, past social history, past surgical history and problem list.    Review of Systems   Constitutional: Negative for activity change, appetite change and fever.   Respiratory: Negative for apnea,  "chest tightness and shortness of breath.    Cardiovascular: Negative for chest pain.   Gastrointestinal: Negative for bowel incontinence, constipation, diarrhea and nausea.   Genitourinary: Negative for bladder incontinence, difficulty urinating and dysuria.   Musculoskeletal: Positive for back pain. Negative for gait problem.   Neurological: Positive for weakness. Negative for dizziness, light-headedness, numbness and headaches. Speech difficulty: speech mildly slurred today.   Psychiatric/Behavioral: Negative for sleep disturbance and suicidal ideas.     Vitals:    04/11/17 1423   BP: 115/77   Pulse: 74   Resp: 16   Temp: 97.6 °F (36.4 °C)   SpO2: 96%   Weight: 220 lb (99.8 kg)   Height: 71\" (180.3 cm)   PainSc:   8   PainLoc: Back       Objective   Physical Exam   Constitutional: He is oriented to person, place, and time. He appears well-developed and well-nourished.   HENT:   Head: Normocephalic.   Eyes: Conjunctivae are normal.   Cardiovascular: Normal rate.    Pulmonary/Chest: Effort normal.   Neurological: He is alert and oriented to person, place, and time.   Skin: Skin is warm and dry.   Psychiatric: He has a normal mood and affect. His behavior is normal. Judgment and thought content normal.   Nursing note and vitals reviewed.    Intrathecal pump was interrogated in office today. Results are in chart.      The patient is currently at a dose of 10 mcg of Prialt per day.        Under sterile technique, the pump was accessed using a [employer] proprietary refill kit. The volume was withdrawn from the pump. The expected residual volume of the pump was 1.2 ml. The actual residual volume of the pump was 2.5 ml.      The pump was then refilled with 20 ml of Prialt at a concentration of 25 mcg/ml.       Patient tolerated procedure well. Minimal bleeding was noted. Pump site remains intact with no evidence of erythema or drainage.      Patient will return for pump refill when due or sooner if needed. "     Assessment/Plan   Tay was seen today for back pain.    Diagnoses and all orders for this visit:    Other chronic pain    Spinal stenosis of lumbar region    Degeneration of intervertebral disc of lumbar region    Lumbar radiculopathy    Intercostal neuralgia    Osteoarthritis of multiple joints, unspecified osteoarthritis type    Other orders  -     pregabalin (LYRICA) 100 MG capsule; Take 1 capsule by mouth 3 (Three) Times a Day.      --- IT pump refilled as noted above. No changes today  --- Does not need refill of hydrocodone.  Patient appears stable with current regimen. No adverse effects. Regarding continuation of opioids, there is no evidence of aberrant behavior or any red flags.  The patient continues with appropriate response to opioid therapy. ADL's remain intact by self.   --- there continues to be a discrepancy between expected and actual residual volume, checking on status of pump dye study  --- The urine drug screen confirmation from 1- has been reviewed and the result is appropriate based on patient history and COURTNEY report  --- Follow-up next pump refill         COURTNEY REPORT  As part of the patient's treatment plan, I am prescribing controlled substances. The patient has been made aware of appropriate use of such medications, including potential risk of somnolence, limited ability to drive and/or work safely, and the potential for dependence or overdose. It has also bee made clear that these medications are for use by this patient only, without concomitant use of alcohol or other substances unless prescribed.     Patient has completed prescribing agreement detailing terms of continued prescribing of controlled substances, including monitoring COURTNEY reports, urine drug screening, and pill counts if necessary. The patient is aware that inappropriate use will results in cessation of prescribing such medications.    COURTNEY report has been reviewed and scanned into the patient's  chart.    Date of last COURTNEY : 4-6-2017    History and physical exam exhibit continued safe and appropriate use of controlled substances.    EMR Dragon/Transcription disclaimer:   Much of this encounter note is an electronic transcription/translation of spoken language to printed text. The electronic translation of spoken language may permit erroneous, or at times, nonsensical words or phrases to be inadvertently transcribed; Although I have reviewed the note for such errors, some may still exist.

## 2017-04-20 ENCOUNTER — TELEPHONE (OUTPATIENT)
Dept: PAIN MEDICINE | Facility: CLINIC | Age: 72
End: 2017-04-20

## 2017-04-20 NOTE — TELEPHONE ENCOUNTER
Patients wife called stating they needed a script of Lyrica for a 90 day supply #180 for there mail order

## 2017-04-25 RX ORDER — PREGABALIN 100 MG/1
100 CAPSULE ORAL 3 TIMES DAILY
Qty: 270 CAPSULE | Refills: 2 | OUTPATIENT
Start: 2017-04-25 | End: 2017-12-06 | Stop reason: SDUPTHER

## 2017-05-11 ENCOUNTER — OUTSIDE FACILITY SERVICE (OUTPATIENT)
Dept: PAIN MEDICINE | Facility: CLINIC | Age: 72
End: 2017-05-11

## 2017-05-11 PROCEDURE — 75809 NONVASCULAR SHUNT X-RAY: CPT | Performed by: ANESTHESIOLOGY

## 2017-05-11 PROCEDURE — 61070 BRAIN CANAL SHUNT PROCEDURE: CPT | Performed by: ANESTHESIOLOGY

## 2017-05-25 ENCOUNTER — OFFICE VISIT (OUTPATIENT)
Dept: PAIN MEDICINE | Facility: CLINIC | Age: 72
End: 2017-05-25

## 2017-05-25 VITALS
DIASTOLIC BLOOD PRESSURE: 99 MMHG | BODY MASS INDEX: 31.5 KG/M2 | OXYGEN SATURATION: 97 % | TEMPERATURE: 97.3 F | WEIGHT: 225 LBS | HEIGHT: 71 IN | HEART RATE: 89 BPM | RESPIRATION RATE: 16 BRPM | SYSTOLIC BLOOD PRESSURE: 160 MMHG

## 2017-05-25 DIAGNOSIS — M51.36 DEGENERATION OF INTERVERTEBRAL DISC OF LUMBAR REGION: ICD-10-CM

## 2017-05-25 DIAGNOSIS — Z97.8 PRESENCE OF INTRATHECAL PUMP: ICD-10-CM

## 2017-05-25 DIAGNOSIS — M54.5 CHRONIC LOW BACK PAIN, UNSPECIFIED BACK PAIN LATERALITY, WITH SCIATICA PRESENCE UNSPECIFIED: ICD-10-CM

## 2017-05-25 DIAGNOSIS — T85.610A MALFUNCTION OF INTRATHECAL INFUSION PUMP, INITIAL ENCOUNTER: ICD-10-CM

## 2017-05-25 DIAGNOSIS — G89.29 CHRONIC LOW BACK PAIN, UNSPECIFIED BACK PAIN LATERALITY, WITH SCIATICA PRESENCE UNSPECIFIED: ICD-10-CM

## 2017-05-25 DIAGNOSIS — M48.061 SPINAL STENOSIS OF LUMBAR REGION: ICD-10-CM

## 2017-05-25 DIAGNOSIS — G89.29 OTHER CHRONIC PAIN: Primary | ICD-10-CM

## 2017-05-25 DIAGNOSIS — Z79.899 ENCOUNTER FOR LONG-TERM CURRENT USE OF HIGH RISK MEDICATION: ICD-10-CM

## 2017-05-25 PROCEDURE — 62369 ANAL SP INF PMP W/REPRG&FILL: CPT | Performed by: NURSE PRACTITIONER

## 2017-05-25 RX ORDER — HYDROCODONE BITARTRATE AND ACETAMINOPHEN 10; 325 MG/1; MG/1
1 TABLET ORAL DAILY PRN
Qty: 30 TABLET | Refills: 0 | Status: SHIPPED | OUTPATIENT
Start: 2017-05-25 | End: 2017-07-25 | Stop reason: SDUPTHER

## 2017-05-30 DIAGNOSIS — Z01.818 PRE-OP EVALUATION: Primary | ICD-10-CM

## 2017-06-04 ENCOUNTER — RESULTS ENCOUNTER (OUTPATIENT)
Dept: PAIN MEDICINE | Facility: CLINIC | Age: 72
End: 2017-06-04

## 2017-06-04 DIAGNOSIS — Z01.818 PRE-OP EVALUATION: ICD-10-CM

## 2017-06-08 RX ORDER — TIZANIDINE 4 MG/1
8 TABLET ORAL EVERY 8 HOURS PRN
Qty: 270 TABLET | Refills: 1 | OUTPATIENT
Start: 2017-06-08 | End: 2017-09-14 | Stop reason: SDUPTHER

## 2017-06-08 NOTE — TELEPHONE ENCOUNTER
Medication Refill Request    Date of phone call: 17    Medication being requested: Tizanidine 4 mg si tablets 4xday  Qty: 56    Date of last visit: 17    Date of last refill: ?    COURTNEY up to date?: 17    Next Follow up?: 17    Any new pertinent information? (i.e, new medication allergies, new use of medications, change in patient's health or condition, non-compliance or inconsistency with prescribing agreement?): patient wants a 90 day supply sent to [facility] mail order with 1 refill

## 2017-06-26 RX ORDER — CITALOPRAM 20 MG/1
TABLET ORAL
Qty: 90 TABLET | Refills: 0 | Status: SHIPPED | OUTPATIENT
Start: 2017-06-26 | End: 2017-07-12 | Stop reason: SDUPTHER

## 2017-06-27 ENCOUNTER — HOSPITAL ENCOUNTER (OUTPATIENT)
Dept: CARDIOLOGY | Facility: HOSPITAL | Age: 72
Discharge: HOME | End: 2017-06-27
Attending: ANESTHESIOLOGY | Admitting: ANESTHESIOLOGY

## 2017-06-27 ENCOUNTER — APPOINTMENT (OUTPATIENT)
Dept: LAB | Facility: HOSPITAL | Age: 72
End: 2017-06-27

## 2017-06-27 ENCOUNTER — HOSPITAL ENCOUNTER (OUTPATIENT)
Dept: GENERAL RADIOLOGY | Facility: HOSPITAL | Age: 72
Discharge: HOME | End: 2017-06-27
Attending: ANESTHESIOLOGY

## 2017-06-27 DIAGNOSIS — Z01.818 PRE-OP EVALUATION: ICD-10-CM

## 2017-06-27 LAB
ANION GAP SERPL CALCULATED.3IONS-SCNC: 11.4 MMOL/L
BASOPHILS # BLD AUTO: 0.04 10*3/MM3 (ref 0–0.2)
BASOPHILS NFR BLD AUTO: 0.6 % (ref 0–1.5)
BUN BLD-MCNC: 8 MG/DL (ref 8–23)
BUN/CREAT SERPL: 6.4 (ref 7–25)
CALCIUM SPEC-SCNC: 9.2 MG/DL (ref 8.6–10.5)
CHLORIDE SERPL-SCNC: 101 MMOL/L (ref 98–107)
CO2 SERPL-SCNC: 24.6 MMOL/L (ref 22–29)
CREAT BLD-MCNC: 1.25 MG/DL (ref 0.76–1.27)
DEPRECATED RDW RBC AUTO: 47.8 FL (ref 37–54)
EOSINOPHIL # BLD AUTO: 0.29 10*3/MM3 (ref 0–0.7)
EOSINOPHIL NFR BLD AUTO: 4.1 % (ref 0.3–6.2)
ERYTHROCYTE [DISTWIDTH] IN BLOOD BY AUTOMATED COUNT: 14.6 % (ref 11.5–14.5)
GFR SERPL CREATININE-BSD FRML MDRD: 57 ML/MIN/1.73
GLUCOSE BLD-MCNC: 132 MG/DL (ref 65–99)
HCT VFR BLD AUTO: 40.3 % (ref 40.4–52.2)
HGB BLD-MCNC: 13.6 G/DL (ref 13.7–17.6)
IMM GRANULOCYTES # BLD: 0.02 10*3/MM3 (ref 0–0.03)
IMM GRANULOCYTES NFR BLD: 0.3 % (ref 0–0.5)
LYMPHOCYTES # BLD AUTO: 2.03 10*3/MM3 (ref 0.9–4.8)
LYMPHOCYTES NFR BLD AUTO: 28.8 % (ref 19.6–45.3)
MCH RBC QN AUTO: 30.2 PG (ref 27–32.7)
MCHC RBC AUTO-ENTMCNC: 33.7 G/DL (ref 32.6–36.4)
MCV RBC AUTO: 89.6 FL (ref 79.8–96.2)
MONOCYTES # BLD AUTO: 0.51 10*3/MM3 (ref 0.2–1.2)
MONOCYTES NFR BLD AUTO: 7.2 % (ref 5–12)
NEUTROPHILS # BLD AUTO: 4.17 10*3/MM3 (ref 1.9–8.1)
NEUTROPHILS NFR BLD AUTO: 59 % (ref 42.7–76)
NRBC BLD MANUAL-RTO: 0 /100 WBC (ref 0–0)
PLATELET # BLD AUTO: 184 10*3/MM3 (ref 140–500)
PMV BLD AUTO: 9.8 FL (ref 6–12)
POTASSIUM BLD-SCNC: 4 MMOL/L (ref 3.5–5.2)
RBC # BLD AUTO: 4.5 10*6/MM3 (ref 4.6–6)
SODIUM BLD-SCNC: 137 MMOL/L (ref 136–145)
WBC NRBC COR # BLD: 7.06 10*3/MM3 (ref 4.5–10.7)

## 2017-06-27 PROCEDURE — 93005 ELECTROCARDIOGRAM TRACING: CPT | Performed by: ANESTHESIOLOGY

## 2017-06-27 PROCEDURE — 80048 BASIC METABOLIC PNL TOTAL CA: CPT | Performed by: ANESTHESIOLOGY

## 2017-06-27 PROCEDURE — 71020 HC CHEST PA AND LATERAL: CPT

## 2017-06-27 PROCEDURE — 36415 COLL VENOUS BLD VENIPUNCTURE: CPT | Performed by: NURSE PRACTITIONER

## 2017-06-27 PROCEDURE — 93010 ELECTROCARDIOGRAM REPORT: CPT | Performed by: INTERNAL MEDICINE

## 2017-06-27 PROCEDURE — 85025 COMPLETE CBC W/AUTO DIFF WBC: CPT | Performed by: ANESTHESIOLOGY

## 2017-06-30 ENCOUNTER — OUTSIDE FACILITY SERVICE (OUTPATIENT)
Dept: PAIN MEDICINE | Facility: CLINIC | Age: 72
End: 2017-06-30

## 2017-06-30 ENCOUNTER — DOCUMENTATION (OUTPATIENT)
Dept: PAIN MEDICINE | Facility: CLINIC | Age: 72
End: 2017-06-30

## 2017-06-30 PROCEDURE — 62350 IMPLANT SPINAL CANAL CATH: CPT | Performed by: ANESTHESIOLOGY

## 2017-06-30 PROCEDURE — 62362 IMPLANT SPINE INFUSION PUMP: CPT | Performed by: ANESTHESIOLOGY

## 2017-07-03 ENCOUNTER — OFFICE VISIT (OUTPATIENT)
Dept: PAIN MEDICINE | Facility: CLINIC | Age: 72
End: 2017-07-03

## 2017-07-03 DIAGNOSIS — T85.610S MALFUNCTION OF INTRATHECAL INFUSION PUMP, SEQUELA: Primary | ICD-10-CM

## 2017-07-03 PROCEDURE — 99024 POSTOP FOLLOW-UP VISIT: CPT | Performed by: ANESTHESIOLOGY

## 2017-07-03 NOTE — PROGRESS NOTES
CHIEF COMPLAINT  Pt states incisional pain,LBP being chief area of pain, is tolerably under control,currently taking 1-2 percocet 10/325 Daily.   Pt does state however that his chronic LBP has reduced to a 3/10.      Subjective   Tay Geiger is a 71 y.o. male  who presents to the office for follow-up of procedure.  He completed a pump revision surgery    on  This past Friday performed by Dr. Yap for management of chronic low back pain. Patient reports moderate relief from the procedure.     With regards to his chronic pain, he feels that his pump is working better.  The deep seated spine pain is improved.  He can tell the difference between this and the acute postop incisional pain.      History of Present Illness     As above    The following portions of the patient's history were reviewed and updated as appropriate: allergies, current medications, past family history, past medical history, past social history, past surgical history and problem list.    Review of Systems    There were no vitals filed for this visit.      Objective   Physical Exam    Incisions all look good, healing well    Assessment/Plan   Diagnoses and all orders for this visit:    Malfunction of intrathecal infusion pump, sequela        --- Follow-up in a week for staple removal    -- continue IT pump use.  The patient does not want to alter dose at this time.      -- so , right now we are running Prialt at 5mcg per day.  This is much improved (previously before revision we were using >10 mcg per day)                 EMR Dragon/Transcription disclaimer:   Much of this encounter note is an electronic transcription/translation of spoken language to printed text. The electronic translation of spoken language may permit erroneous, or at times, nonsensical words or phrases to be inadvertently transcribed; Although I have reviewed the note for such errors, some may still exist.

## 2017-07-03 NOTE — PROGRESS NOTES
Intrathecal Pump Revision    Preop Dx: Malfunction of intrathecal infusion pump, presence of intrathecal pump  Postop Dx: Same as preop dx    Neuromodulation System: Medtronic Synchromed II     Surgeon:  Chapin Yap MD  Asst. Surgeon:  Faith Servin MD  Other Assistant:  Zheng Sahni RN @ [facility]    Preprocedure Discussion with Patient:  Risks and complications were discussed with the patient prior to starting the procedure and informed consent was obtained.  We discussed various topics including but not limited to bleeding, infection, injury, allergic reactions, spinal cord and/or neural injury, implant site pain, post procedural site soreness, equipment malfunction, risk of the lack of pain relief, and risk of worsening clinical picture.    Reason for procedure:  This very pleasant 71-year-old gentleman has been having increasing difficulty with the use of his intrathecal pump.  The problem is that despite continued issues he has been having more and more back pain.  Recently we performed a pump dye study and there was visualized a kink in his spinal catheter line.  On routine and repeat interrogations of the pump, his actual residual volumes have been a few milliliters more than the expected residual volume from the .  These have been running at about 6 mm versus 3 mL.  Therefore as the patient was prescribed approximately 10 µg per day of ziconotide intrathecally, we don't know exactly compared to that dose he is getting that can rest assured that he is definitely not getting that full dose.      Therefore the intent and original plan was to try to repair the area where the kink was located, which was near the midline and near the anchor.  However this proved to be much more difficult and complicated than expected.  We had discussed with the patient that the repair of such a catheter can be tricky in that difficulties and complications can arise and he previously for this  understanding and consent to proceed as he was definitely wanting to try to find more relief from the use of his implanted device..      Sedation:  General anesthesia which was provided by Dr. Marroquin of the [facility] anesthesia group  Antibiotics:   Prior to incision, the patient received Kefzol 2gIV and also Vancomycin 1gIV was started prior to incision and ran in over an hour.  At 15:01, Kefzol was re-dosed.    Description of Procedure:      After obtaining informed consent, IV access had been obtained by nursing staff in the preoperative area.  The patient was transported to the operative suite and was placed in a right lateral decubitus position.  Appropriate padding was placed around and under the patient.  Anesthesia care and cardiopulmonary monitoring maintained by member of the anesthesiology team throughout the procedure.  Perioperative antibodies were given prior to the incision per routine as indicated in the anesthesia record and indicated above.  The patient's surgical site was cleansed appropriately with routine cleansing, and then prepped in a sterile routine fashion.  The area was then draped in sterile routine fashion.     The intrathecal pump was visualized under fluoroscopy and the side port was accessed for routine.  Aspiration was difficult but positive.  About 0.2 mL of clear solution was aspirated from the catheter which is approximately consistent with the volume of the catheter.  Injection of contrast dye through the catheter was difficult but the catheter was highlighted up to the point where the catheter tip was within the spine at about the T12 level.  The ketogenic catheter was visualized is possible to the anchor just off midline.    The midline incision was anesthetized and opened and irrigated with copious amounts.  Solution the kink in the line was identified and it was unable to be straightened out.  The angle at which the anchor was placed was placing the catheter and a strain and that  catheter.  Then in that position for such a long period of time could not be straightened.  Therefore this section of catheter including the anchor was cut out.    A splicing care was provided by the [employer] device representative.  This was connected per routine as per their instructions multiple times and each time there was a leak.  After manipulation of the catheter and, there was also several small pinhole leak sits prolonged from the portion of the catheter was running away from the midline towards the pump pocket.  These pinhole leaks were multiple and in this entire section of catheter, so this section the catheter could not be easily removed for repair.    Therefore, after placing a new section of catheter on 2 the position leading into the spine, using the splicing kit and anchors, the catheter running away from the midline toward the pump catheter was addressed.  The pump pocket was opened and the pump was removed from the pocket and the catheter disconnected from the pump.  The section catheter was removed and discarded.  The tunneling device was utilized to attempt to tell from the IPG pocket wound to the midline incision area.    Unfortunately the new catheter provided was not long enough to reach from the pump pocket to the midline area and after so much work and discouragement after attempting to splice the previous catheters, I was very reluctant to splice more catheters together in this extended section of tunneling device.  Therefore I elected to close the primary pump pocket, and may continue pump pocket in the left lumbar posterior flank.    New catheter was attached to the midline section of the catheter, having traveled from this new pocket to the midline using the provided epidural needle indicated.  The pump was removed from the field cleansed, drained, and refilled with 20 mL of ziconotide at 25 mcg/mL.  The the pump was programmed to deliver ziconotide 5 µg per hour and was prepared for  "priming.  The pump was connected and probably was initiated.  The pump was secured to the underlying tissue using silk sutures to the islet from the pump.  The catheter of the midline was secured with multiple stress relieving loops and multiple anchors.    All incision areas were irrigated with copious amounts of irrigant solution ×3 prior to closure.    The incision was closed with interrupted 2-0 Vicryl sutures at each site followed by staples and then dressed with small strip Telfa and a small Tegaderm.      Estimated Blood Loss: <50 mL    Specimens:   Section of intrathecal catheter from the area of the anchor which was about 1.5\" in length which is where the kink was found, Section of catheter which had ran from the midline toward the prior pump pocket, pump catheter from inside the prior pump pocket    Complications:  As previously elaborated upon, there are many technical complications during the course of this extended procedure.  These are outlined to the best of my ability in the description of procedure.        Tolerance and discharge condition:    The patient tolerated the procedure well.  As he was awakening from anesthesia, the patient did have vomiting.  He was still in the lateral decubitus position when the airway device was removed and the vomiting occurred.  The patient was transported to the recovery area without difficulties. The patient was discharged home under the care of family members in stable and satisfactory condition.  He denies any nausea in the recovery area.      Plan of care:    -- The patient was given our standard instruction sheet and will resume all medications as per the medication reconciliation sheet.    -- The patient will return to my office on Monday, which will be the next business day, for reprogramming if needed and a check-in postop visit   -- The patient will also present to my office in 10-14 days for a postoperative wound check.    The patient understands that " there is any signs of complication, bleeding, infection, fever, chills, increasing back pain or spine pain, any neurologic deficit, or any other concerning finding, that they are family member should call my office at [phone] at any time to access the answering service.    --  EMR Dragon/Transcription disclaimer:   Much of this encounter note is an electronic transcription/translation of spoken language to printed text. The electronic translation of spoken language may permit erroneous, or at times, nonsensical words or phrases to be inadvertently transcribed; Although I have reviewed the note for such errors, some may still exist.

## 2017-07-06 RX ORDER — CITALOPRAM 20 MG/1
TABLET ORAL
Qty: 90 TABLET | Refills: 0 | OUTPATIENT
Start: 2017-07-06

## 2017-07-12 ENCOUNTER — OFFICE VISIT (OUTPATIENT)
Dept: PAIN MEDICINE | Facility: CLINIC | Age: 72
End: 2017-07-12

## 2017-07-12 VITALS
DIASTOLIC BLOOD PRESSURE: 81 MMHG | TEMPERATURE: 100.2 F | SYSTOLIC BLOOD PRESSURE: 129 MMHG | HEART RATE: 85 BPM | WEIGHT: 227 LBS | OXYGEN SATURATION: 97 % | BODY MASS INDEX: 31.78 KG/M2 | HEIGHT: 71 IN | RESPIRATION RATE: 16 BRPM

## 2017-07-12 DIAGNOSIS — M54.16 LUMBAR RADICULOPATHY: ICD-10-CM

## 2017-07-12 DIAGNOSIS — M48.061 SPINAL STENOSIS OF LUMBAR REGION: ICD-10-CM

## 2017-07-12 DIAGNOSIS — Z97.8 PRESENCE OF INTRATHECAL PUMP: ICD-10-CM

## 2017-07-12 PROCEDURE — 62368 ANALYZE SP INF PUMP W/REPROG: CPT | Performed by: ANESTHESIOLOGY

## 2017-07-12 RX ORDER — CITALOPRAM 20 MG/1
20 TABLET ORAL DAILY
Qty: 90 TABLET | Refills: 3 | Status: SHIPPED | OUTPATIENT
Start: 2017-07-12 | End: 2018-03-13 | Stop reason: SDUPTHER

## 2017-07-12 NOTE — PROGRESS NOTES
"CHIEF COMPLAINT  Since pump revision on 6/30/17, Pt states chronic back pain will reduce to a 3/10 for about 4 hours when taking percocet. Otherwise, chronic pain is basically unchanged since surgery.  Pt does report significant incisional pain.      Subjective   Tay Geiger is a 71 y.o. male  who presents to the office for follow-up of procedure.  He completed a pump revision   on  6-30 performed by Dr. Yap for management of back pain. Patient reports mild relief from the procedure.     He is having some mild incisional pain of all 3 of the incision areas.  Percocet helps with that significantly.    History of Present Illness     PEG Assessment   What number best describes your pain on average in the past week?7  What number best describes how, during the past week, pain has interfered with your enjoyment of life?9  What number best describes how, during the past week, pain has interfered with your general activity?  9      The following portions of the patient's history were reviewed and updated as appropriate: allergies, current medications, past family history, past medical history, past social history, past surgical history and problem list.    Review of Systems   Constitutional: Positive for activity change (decreased). Negative for appetite change and fever.   Respiratory: Positive for apnea (\"mild\") and shortness of breath (upon exertion). Negative for chest tightness.    Cardiovascular: Negative for chest pain.   Gastrointestinal: Negative for constipation, diarrhea and nausea.   Genitourinary: Negative for difficulty urinating and dysuria.   Musculoskeletal: Positive for back pain (L leg pain as well). Negative for gait problem.   Neurological: Positive for numbness (L leg,to ankle). Negative for dizziness, weakness, light-headedness and headaches. Speech difficulty: speech mildly slurred today.   Psychiatric/Behavioral: Positive for confusion (memory deficit) and sleep disturbance. Negative for " "suicidal ideas.       Vitals:    07/12/17 1108   BP: 129/81   Pulse: 85   Resp: 16   Temp: 100.2 °F (37.9 °C)   SpO2: 97%   Weight: 227 lb (103 kg)   Height: 71\" (180.3 cm)   PainSc: 5  Comment: L sided LBP ranges from 3-8/10   PainLoc: Back         Objective   Physical Exam    There was some slight erythema around the abdominal incision line, but it was not indurated and noninflamed.  There was no irritation around the other 2 and incision spots posteriorly.      Assessment/Plan   There are no diagnoses linked to this encounter.    --- Follow-up as needed for refill  -- if needed, he can come in again for further reprogramming & dose adjustment  -- increase dose from 5mcg to 7mcg per day  -- staple removal today was performed and was without incident.    The intrathecal pump was interrogated and the dose was found to be running at 5 µg per day.  The patient does not have a PTM dosing.  The rate was increased to 7 µg per day.    He was instructed to return if he's having any dizziness or sedation or nausea or any other untoward side effects from the higher dose of medication.  He should come in immediately during business hours and we can turn down the rate if necessary.                   EMR Dragon/Transcription disclaimer:   Much of this encounter note is an electronic transcription/translation of spoken language to printed text. The electronic translation of spoken language may permit erroneous, or at times, nonsensical words or phrases to be inadvertently transcribed; Although I have reviewed the note for such errors, some may still exist.      "

## 2017-07-25 ENCOUNTER — OFFICE VISIT (OUTPATIENT)
Dept: PAIN MEDICINE | Facility: CLINIC | Age: 72
End: 2017-07-25

## 2017-07-25 VITALS
HEIGHT: 71 IN | RESPIRATION RATE: 16 BRPM | DIASTOLIC BLOOD PRESSURE: 80 MMHG | BODY MASS INDEX: 31.36 KG/M2 | OXYGEN SATURATION: 95 % | HEART RATE: 77 BPM | TEMPERATURE: 96.1 F | SYSTOLIC BLOOD PRESSURE: 123 MMHG | WEIGHT: 224 LBS

## 2017-07-25 DIAGNOSIS — G89.29 CHRONIC LOW BACK PAIN, UNSPECIFIED BACK PAIN LATERALITY, WITH SCIATICA PRESENCE UNSPECIFIED: ICD-10-CM

## 2017-07-25 DIAGNOSIS — G58.8 INTERCOSTAL NEURALGIA: ICD-10-CM

## 2017-07-25 DIAGNOSIS — Z97.8 PRESENCE OF INTRATHECAL PUMP: ICD-10-CM

## 2017-07-25 DIAGNOSIS — M54.5 CHRONIC LOW BACK PAIN, UNSPECIFIED BACK PAIN LATERALITY, WITH SCIATICA PRESENCE UNSPECIFIED: ICD-10-CM

## 2017-07-25 DIAGNOSIS — G89.29 OTHER CHRONIC PAIN: Primary | ICD-10-CM

## 2017-07-25 PROCEDURE — 62368 ANALYZE SP INF PUMP W/REPROG: CPT | Performed by: NURSE PRACTITIONER

## 2017-07-25 PROCEDURE — 99213 OFFICE O/P EST LOW 20 MIN: CPT | Performed by: NURSE PRACTITIONER

## 2017-07-25 RX ORDER — HYDROCODONE BITARTRATE AND ACETAMINOPHEN 10; 325 MG/1; MG/1
1 TABLET ORAL DAILY PRN
Qty: 30 TABLET | Refills: 0 | Status: SHIPPED | OUTPATIENT
Start: 2017-07-25 | End: 2017-08-11 | Stop reason: SDUPTHER

## 2017-07-25 RX ORDER — HYDRALAZINE HYDROCHLORIDE 50 MG/1
100 TABLET, FILM COATED ORAL 2 TIMES DAILY
COMMUNITY
Start: 2017-05-02

## 2017-07-25 NOTE — PROGRESS NOTES
CHIEF COMPLAINT    Pt states back pain has increased over the last week. Last visit was 7-12 and pt had staples removed from pain pump surgery incision. His back pain that radiates into chest has increased. Wife states he ran out of his pain medicine.     Subjective   Tay Geiger is a 72 y.o. male  who presents to the office for follow-up.He has a history of chronic back pain and intercostal neuralgia.  IT pump revision 6/30/2017.  We have been slowly titrating dose of IT Prialt since surgery date.  He reports today that is pain is inadequately controlled overall. Prior to pump revision IT Prialt dose was at 10 mcg/day, currently his dose is at 7 mcg/day.  He also has been prescribed hydrocodone PRN for breakthrough pain, he is completely out of this and has been without this medication for the past week.      Back Pain   This is a chronic problem. The current episode started more than 1 year ago. The problem occurs constantly. The problem is unchanged. The pain is present in the lumbar spine. The quality of the pain is described as aching. The pain radiates to the left foot. The pain is at a severity of 8/10. The pain is the same all the time. Exacerbated by: cold weather, prolonged walking. Associated symptoms include numbness (L leg,to ankle). Pertinent negatives include no bladder incontinence, bowel incontinence, chest pain (radiating pain from back), dysuria, fever, headaches or weakness. Risk factors include lack of exercise and sedentary lifestyle. Treatments tried: IT pump with Prialt, Lyrica. The treatment provided moderate relief.      PEG Assessment   What number best describes your pain on average in the past week?8  What number best describes how, during the past week, pain has interfered with your enjoyment of life?10  What number best describes how, during the past week, pain has interfered with your general activity?  10    The following portions of the patient's history were reviewed and  "updated as appropriate: allergies, current medications, past family history, past medical history, past social history, past surgical history and problem list.    Review of Systems   Constitutional: Positive for activity change (decreased). Negative for appetite change, chills and fever.   Respiratory: Positive for apnea (\"mild\") and shortness of breath (upon exertion). Negative for cough, chest tightness and wheezing.    Cardiovascular: Negative for chest pain (radiating pain from back) and palpitations.   Gastrointestinal: Negative for bowel incontinence, constipation, diarrhea and nausea.   Genitourinary: Negative for bladder incontinence, difficulty urinating and dysuria.   Musculoskeletal: Positive for back pain (L leg pain as well). Negative for gait problem.   Neurological: Positive for numbness (L leg,to ankle). Negative for dizziness, weakness, light-headedness and headaches. Speech difficulty: speech mildly slurred today.   Psychiatric/Behavioral: Positive for confusion (memory deficit) and sleep disturbance. Negative for agitation, hallucinations and suicidal ideas. The patient is not nervous/anxious.        Vitals:    07/25/17 0917   BP: 123/80   Pulse: 77   Resp: 16   Temp: 96.1 °F (35.6 °C)   SpO2: 95%   Weight: 224 lb (102 kg)   Height: 71\" (180.3 cm)   PainSc:   8   PainLoc: Back         Objective   Physical Exam   Constitutional: He is oriented to person, place, and time. He appears well-developed and well-nourished.   HENT:   Head: Normocephalic.   Eyes: Conjunctivae are normal.   Cardiovascular: Normal rate.    Pulmonary/Chest: Effort normal.   Neurological: He is alert and oriented to person, place, and time.   Skin: Skin is warm and dry.        Psychiatric: He has a normal mood and affect. His behavior is normal. Judgment and thought content normal.   Nursing note and vitals reviewed.      Assessment/Plan   Tay was seen today for back pain.    Diagnoses and all orders for this " visit:    Other chronic pain    Presence of intrathecal pump    Intercostal neuralgia    Chronic low back pain, unspecified back pain laterality, with sciatica presence unspecified    Other orders  -     HYDROcodone-acetaminophen (NORCO)  MG per tablet; Take 1 tablet by mouth Daily As Needed for Moderate Pain (4-6).      --- Refill Hydrocodone for severe breakthrough pain.  Patient appears stable with current regimen. No adverse effects. Regarding continuation of opioids, there is no evidence of aberrant behavior or any red flags.  The patient continues with appropriate response to opioid therapy. ADL's remain intact by self.   --- Routine ODT in office today as part of monitoring requirements for controlled substances.  This specimen will be sent to [facility] for confirmation.     --- Increase IT prialt dose to 8.5 mcg/day   --- Follow-up 1 month          COURTNEY REPORT    As part of the patient's treatment plan, I am prescribing controlled substances. The patient has been made aware of appropriate use of such medications, including potential risk of somnolence, limited ability to drive and/or work safely, and the potential for dependence or overdose. It has also bee made clear that these medications are for use by this patient only, without concomitant use of alcohol or other substances unless prescribed.     Patient has completed prescribing agreement detailing terms of continued prescribing of controlled substances, including monitoring COURTNEY reports, urine drug screening, and pill counts if necessary. The patient is aware that inappropriate use will results in cessation of prescribing such medications.    COURTNEY report has been reviewed and scanned into the patient's chart.    Date of last COURTNEY : 7/25/2017    History and physical exam exhibit continued safe and appropriate use of controlled substances.      EMR Dragon/Transcription disclaimer:   Much of this encounter note is an electronic  transcription/translation of spoken language to printed text. The electronic translation of spoken language may permit erroneous, or at times, nonsensical words or phrases to be inadvertently transcribed; Although I have reviewed the note for such errors, some may still exist.

## 2017-08-11 ENCOUNTER — OFFICE VISIT (OUTPATIENT)
Dept: PAIN MEDICINE | Facility: CLINIC | Age: 72
End: 2017-08-11

## 2017-08-11 ENCOUNTER — LAB REQUISITION (OUTPATIENT)
Dept: LAB | Facility: HOSPITAL | Age: 72
End: 2017-08-11

## 2017-08-11 VITALS
OXYGEN SATURATION: 98 % | TEMPERATURE: 96.4 F | HEIGHT: 71 IN | RESPIRATION RATE: 16 BRPM | WEIGHT: 228 LBS | SYSTOLIC BLOOD PRESSURE: 122 MMHG | HEART RATE: 69 BPM | BODY MASS INDEX: 31.92 KG/M2 | DIASTOLIC BLOOD PRESSURE: 79 MMHG

## 2017-08-11 DIAGNOSIS — R60.9 EXCESSIVE INCISIONAL EDEMA, INITIAL ENCOUNTER: ICD-10-CM

## 2017-08-11 DIAGNOSIS — Z79.899 ENCOUNTER FOR LONG-TERM CURRENT USE OF HIGH RISK MEDICATION: ICD-10-CM

## 2017-08-11 DIAGNOSIS — G89.29 OTHER CHRONIC PAIN: Primary | ICD-10-CM

## 2017-08-11 DIAGNOSIS — Z00.00 ENCOUNTER FOR GENERAL ADULT MEDICAL EXAMINATION WITHOUT ABNORMAL FINDINGS: ICD-10-CM

## 2017-08-11 DIAGNOSIS — Z97.8 PRESENCE OF INTRATHECAL PUMP: ICD-10-CM

## 2017-08-11 DIAGNOSIS — T81.89XA EXCESSIVE INCISIONAL EDEMA, INITIAL ENCOUNTER: ICD-10-CM

## 2017-08-11 DIAGNOSIS — L76.34 POSTPROCEDURAL SEROMA OF SKIN AND SUBCUTANEOUS TISSUE FOLLOWING OTHER PROCEDURE: ICD-10-CM

## 2017-08-11 PROCEDURE — 87070 CULTURE OTHR SPECIMN AEROBIC: CPT | Performed by: NURSE PRACTITIONER

## 2017-08-11 PROCEDURE — 87205 SMEAR GRAM STAIN: CPT | Performed by: NURSE PRACTITIONER

## 2017-08-11 PROCEDURE — 99214 OFFICE O/P EST MOD 30 MIN: CPT | Performed by: NURSE PRACTITIONER

## 2017-08-11 PROCEDURE — 87015 SPECIMEN INFECT AGNT CONCNTJ: CPT | Performed by: NURSE PRACTITIONER

## 2017-08-11 RX ORDER — HYDROCODONE BITARTRATE AND ACETAMINOPHEN 10; 325 MG/1; MG/1
1 TABLET ORAL EVERY 8 HOURS PRN
Qty: 45 TABLET | Refills: 0 | Status: SHIPPED | OUTPATIENT
Start: 2017-08-11 | End: 2017-09-06

## 2017-08-11 NOTE — PROGRESS NOTES
"CHIEF COMPLAINT    Since last visit, pt's post incisional pain has increased. Pt's wife states it started swelling Tuesday, no fever or chills, and he has a \"burning\" pain in the middle incision area. Area appears swollen with a \"spongey\" feel.     Subjective   Tay Geiger is a 72 y.o. male  who presents to the office for follow-up.He has a history of chronic low back pain.    He is reporting swelling of both incisions. Started three days ago. It is tender, painful, burning. No drainage.  No trauma to the area. Denies fever, nausea/vomiting, chills.  No headaches. No dizziness, confusion.      He is also reporting no improvement in overall pain control since most recent Prialt adjustment. He continues to use hydrocodone PRN for breakthrough pain, he has required increase in dose over the past week.  He denies adverse reactions. ADL's by self.      Back Pain   This is a chronic problem. The current episode started more than 1 year ago. The problem occurs constantly. The problem is unchanged. The pain is present in the lumbar spine. The quality of the pain is described as aching. The pain radiates to the left foot. The pain is at a severity of 8/10. The pain is the same all the time. Exacerbated by: cold weather, prolonged walking. Associated symptoms include numbness (L leg,to ankle). Pertinent negatives include no bladder incontinence, bowel incontinence, chest pain (radiating pain from back), dysuria, fever, headaches or weakness. Risk factors include lack of exercise and sedentary lifestyle. Treatments tried: IT pump with Prialt, Lyrica. The treatment provided moderate relief.      PEG Assessment   What number best describes your pain on average in the past week?7  What number best describes how, during the past week, pain has interfered with your enjoyment of life?10  What number best describes how, during the past week, pain has interfered with your general activity?  10    The following portions of the " "patient's history were reviewed and updated as appropriate: allergies, current medications, past family history, past medical history, past social history, past surgical history and problem list.    Review of Systems   Constitutional: Positive for activity change (decreased). Negative for appetite change, chills and fever.   Respiratory: Positive for apnea (\"mild\" sleep) and shortness of breath (upon exertion). Negative for cough, chest tightness and wheezing.    Cardiovascular: Negative for chest pain (radiating pain from back) and palpitations.   Gastrointestinal: Negative for bowel incontinence, constipation, diarrhea and nausea.   Genitourinary: Negative for bladder incontinence, difficulty urinating and dysuria.   Musculoskeletal: Positive for back pain (L leg pain as well). Negative for gait problem.   Neurological: Positive for numbness (L leg,to ankle). Negative for dizziness, weakness, light-headedness and headaches. Speech difficulty: speech mildly slurred today.   Psychiatric/Behavioral: Positive for sleep disturbance. Negative for agitation, confusion, hallucinations and suicidal ideas. The patient is not nervous/anxious.      Vitals:    08/11/17 1054   BP: 122/79   Pulse: 69   Resp: 16   Temp: 96.4 °F (35.8 °C)   SpO2: 98%   Weight: 228 lb (103 kg)   Height: 71\" (180.3 cm)   PainSc:   8   PainLoc: Back     Objective   Physical Exam   Constitutional: He is oriented to person, place, and time. He appears well-developed and well-nourished.   HENT:   Head: Normocephalic.   Eyes: Conjunctivae are normal.   Cardiovascular: Normal rate.    Pulmonary/Chest: Effort normal.   Musculoskeletal:        Lumbar back: He exhibits tenderness.   Neurological: He is alert and oriented to person, place, and time.   Skin: Skin is warm and dry.        Psychiatric: He has a normal mood and affect. His behavior is normal. Judgment and thought content normal.   Nursing note and vitals reviewed.    Aspiration of surgical " incisions  Two surgical incision (left gluteal and midline thoracolumbar) both cleaned and prepped aseptically. The areas were numbed with 1% Lidocaine.  Next an 18 gague needle was used to aspirate fluid from each incision site.  65 mL serous/serousangunious fluid from the midline site aspirated and 50mL serous fluid from the gluteal site aspirated.  Aspirate to be sent for culture. Patient tolerated procedure well without incident.      Procedures    Assessment/Plan   Tay was seen today for back pain.    Diagnoses and all orders for this visit:    Other chronic pain    Postprocedural seroma of skin and subcutaneous tissue following other procedure  -     Abdominal Binder    Excessive incisional edema, initial encounter  -     Body Fluid Culture; Future    Presence of intrathecal pump    Encounter for long-term current use of high risk medication    Other orders  -     HYDROcodone-acetaminophen (NORCO)  MG per tablet; Take 1 tablet by mouth Every 8 (Eight) Hours As Needed for Moderate Pain (4-6).      --- Large seromas of both surgical sites, both aspirated in office today, 65 mL serous/serousangunious fluid from the midline site aspirated and 50mL serous fluid from the pump pocket site aspirated  --- Aspirate sent for culture  --- Refill hydrocodone, acute dose escalation today.  Patient appears stable with current regimen. No adverse effects. Regarding continuation of opioids, there is no evidence of aberrant behavior or any red flags.  The patient continues with appropriate response to opioid therapy. ADL's remain intact by self.   ---The urine drug screen confirmation from 7/25/2017 has been reviewed and the result is appropriate based on patient history and COURTNEY report  --- Follow-up Monday         COURTNEY REPORT  As part of the patient's treatment plan, I am prescribing controlled substances. The patient has been made aware of appropriate use of such medications, including potential risk of  somnolence, limited ability to drive and/or work safely, and the potential for dependence or overdose. It has also bee made clear that these medications are for use by this patient only, without concomitant use of alcohol or other substances unless prescribed.     Patient has completed prescribing agreement detailing terms of continued prescribing of controlled substances, including monitoring COURTNEY reports, urine drug screening, and pill counts if necessary. The patient is aware that inappropriate use will results in cessation of prescribing such medications.    COURTNEY report has been reviewed and scanned into the patient's chart.    Date of last COURTNEY : 8/10/2017    History and physical exam exhibit continued safe and appropriate use of controlled substances.    EMR Dragon/Transcription disclaimer:   Much of this encounter note is an electronic transcription/translation of spoken language to printed text. The electronic translation of spoken language may permit erroneous, or at times, nonsensical words or phrases to be inadvertently transcribed; Although I have reviewed the note for such errors, some may still exist.

## 2017-08-14 ENCOUNTER — OFFICE VISIT (OUTPATIENT)
Dept: PAIN MEDICINE | Facility: CLINIC | Age: 72
End: 2017-08-14

## 2017-08-14 VITALS
OXYGEN SATURATION: 96 % | WEIGHT: 228 LBS | HEIGHT: 71 IN | DIASTOLIC BLOOD PRESSURE: 89 MMHG | BODY MASS INDEX: 31.92 KG/M2 | HEART RATE: 86 BPM | RESPIRATION RATE: 16 BRPM | SYSTOLIC BLOOD PRESSURE: 140 MMHG | TEMPERATURE: 97.8 F

## 2017-08-14 DIAGNOSIS — M48.061 SPINAL STENOSIS OF LUMBAR REGION: ICD-10-CM

## 2017-08-14 DIAGNOSIS — G89.29 OTHER CHRONIC PAIN: Primary | ICD-10-CM

## 2017-08-14 DIAGNOSIS — L76.34 POSTPROCEDURAL SEROMA OF SKIN AND SUBCUTANEOUS TISSUE FOLLOWING OTHER PROCEDURE: ICD-10-CM

## 2017-08-14 DIAGNOSIS — M51.36 DEGENERATION OF INTERVERTEBRAL DISC OF LUMBAR REGION: ICD-10-CM

## 2017-08-14 DIAGNOSIS — Z97.8 PRESENCE OF INTRATHECAL PUMP: ICD-10-CM

## 2017-08-14 LAB
BACTERIA FLD CULT: NORMAL
GRAM STN SPEC: NORMAL

## 2017-08-14 PROCEDURE — 99212 OFFICE O/P EST SF 10 MIN: CPT | Performed by: NURSE PRACTITIONER

## 2017-08-14 NOTE — PROGRESS NOTES
CHIEF COMPLAINT  Moderate swelling noted today at R side of center incision.  Pt reportedly had severe back pain on 8/12/17 and 8/13/17.    Subjective   Tay Geiger is a 72 y.o. male  who presents to the office for follow-up.He has a history of chronic back pain.    Patient was evaluated by Zahira PELAEZ on 8/11/17.  Patient presented for swelling of both incision sites.  He had a pain pump revision done by Dr. Yap on 6/30/17.  On 8/8/17 and he started noticing swelling of both incision sites.  He denied any fever or nausea vomiting or chills.  He is diagnosed with large thrombus or postsurgical sites.  There were both aspirated the office.  65 mL serous/serosanguineous fluid from the midline site was aspirated.  50 mL of serous fluid from the pump pocket site was aspirated.  Last minutes was sent for culture.  He was given a dose escalation of hydrocodone.He was given a prescription but his pharmacy would not fill this Friday. He has also been wearing the ABD binder. His wife notes approximately 50% improvement in the side of the midline seroma. He and his wife note that his pain was worse for the first two days after seromas were drained. However, he is feeling a little better today. Has been taking Hydrocodone approximately q8hrs PRN. Denies any side effects from this. Denies any fever or chills.     Complains of pain in his back. Today his pain is 8/10VAS. Describes the pain as continuous. Overall, he states he is better than when he was seen on Friday 8-11-17.     Back Pain   This is a chronic problem. The current episode started more than 1 year ago. The problem occurs constantly. The problem is unchanged. The pain is present in the lumbar spine. The quality of the pain is described as aching. The pain radiates to the left foot. The pain is at a severity of 8/10. The pain is the same all the time. Exacerbated by: cold weather, prolonged walking. Pertinent negatives include no bladder  "incontinence, bowel incontinence, chest pain (radiating pain from back), dysuria, fever, headaches, numbness (L leg,to ankle) or weakness. Risk factors include lack of exercise and sedentary lifestyle. He has tried analgesics (IT pump with Prialt, Lyrica) for the symptoms. The treatment provided moderate relief.          PEG Assessment   What number best describes your pain on average in the past week?9  What number best describes how, during the past week, pain has interfered with your enjoyment of life?9  What number best describes how, during the past week, pain has interfered with your general activity?  9    The following portions of the patient's history were reviewed and updated as appropriate: allergies, current medications, past family history, past medical history, past social history, past surgical history and problem list.    Review of Systems   Constitutional: Positive for activity change (decreased). Negative for appetite change, chills and fever.   Respiratory: Positive for apnea (\"mild\") and shortness of breath (upon exertion). Negative for cough, chest tightness and wheezing.    Cardiovascular: Negative for chest pain (radiating pain from back) and palpitations.   Gastrointestinal: Negative for bowel incontinence, constipation, diarrhea and nausea.   Genitourinary: Negative for bladder incontinence, difficulty urinating and dysuria.   Musculoskeletal: Positive for back pain (L leg pain as well). Negative for gait problem.   Neurological: Negative for dizziness, weakness, light-headedness, numbness (L leg,to ankle) and headaches. Speech difficulty: speech mildly slurred today.   Psychiatric/Behavioral: Positive for confusion (memory deficit) and sleep disturbance. Negative for agitation, hallucinations and suicidal ideas. The patient is not nervous/anxious.        Vitals:    08/14/17 1107   BP: 140/89   Pulse: 86   Resp: 16   Temp: 97.8 °F (36.6 °C)   SpO2: 96%   Weight: 228 lb (103 kg)   Height: 71\" " (180.3 cm)   PainSc: 8  Comment: L sided LBP ranges from 5-9/10   PainLoc: Back     Objective   Physical Exam   Constitutional: He is oriented to person, place, and time. He appears well-developed and well-nourished.   HENT:   Head: Normocephalic.   Eyes: Conjunctivae are normal.   Cardiovascular: Normal rate.    Pulmonary/Chest: Effort normal.   Musculoskeletal:        Lumbar back: He exhibits tenderness.   Neurological: He is alert and oriented to person, place, and time.   Skin: Skin is warm and dry.        Psychiatric: He has a normal mood and affect. His behavior is normal. Judgment and thought content normal.   Nursing note and vitals reviewed.      Assessment/Plan   Tay was seen today for back pain.    Diagnoses and all orders for this visit:    Other chronic pain    Spinal stenosis of lumbar region    Postprocedural seroma of skin and subcutaneous tissue following other procedure    Degeneration of intervertebral disc of lumbar region    Presence of intrathecal pump      --- continue with regimen as previously prescribed. No changes at this time.  ---  Reviewed signs and symptoms of infection, including but not limited to-- fever, chills, stiff neck, headache, flu-like symptoms, increased tenderness and redness to incisions, as well as drainage or swelling. If patient notices this, he is to call office immediately. Patient verbalized understanding.  --- Follow-up for pump refill/Dr. Finn VALDEZ REPORT    As part of the patient's treatment plan, I am prescribing controlled substances. The patient has been made aware of appropriate use of such medications, including potential risk of somnolence, limited ability to drive and/or work safely, and the potential for dependence or overdose. It has also bee made clear that these medications are for use by this patient only, without concomitant use of alcohol or other substances unless prescribed.     Patient has completed prescribing agreement detailing  terms of continued prescribing of controlled substances, including monitoring COURTNEY reports, urine drug screening, and pill counts if necessary. The patient is aware that inappropriate use will results in cessation of prescribing such medications.    COURTNEY report has been reviewed and scanned into the patient's chart.    Date of last COURTNEY : 8-9-17    History and physical exam exhibit continued safe and appropriate use of controlled substances.      EMR Dragon/Transcription disclaimer:   Much of this encounter note is an electronic transcription/translation of spoken language to printed text. The electronic translation of spoken language may permit erroneous, or at times, nonsensical words or phrases to be inadvertently transcribed; Although I have reviewed the note for such errors, some may still exist.

## 2017-08-29 ENCOUNTER — OFFICE VISIT (OUTPATIENT)
Dept: PAIN MEDICINE | Facility: CLINIC | Age: 72
End: 2017-08-29

## 2017-08-29 VITALS
WEIGHT: 229 LBS | HEIGHT: 71 IN | HEART RATE: 77 BPM | RESPIRATION RATE: 16 BRPM | DIASTOLIC BLOOD PRESSURE: 100 MMHG | OXYGEN SATURATION: 95 % | BODY MASS INDEX: 32.06 KG/M2 | TEMPERATURE: 97.1 F | SYSTOLIC BLOOD PRESSURE: 200 MMHG

## 2017-08-29 DIAGNOSIS — M48.061 SPINAL STENOSIS OF LUMBAR REGION: ICD-10-CM

## 2017-08-29 DIAGNOSIS — G58.8 INTERCOSTAL NEURALGIA: ICD-10-CM

## 2017-08-29 DIAGNOSIS — Z97.8 PRESENCE OF INTRATHECAL PUMP: ICD-10-CM

## 2017-08-29 DIAGNOSIS — G89.29 OTHER CHRONIC PAIN: Primary | ICD-10-CM

## 2017-08-29 DIAGNOSIS — Z79.899 ENCOUNTER FOR LONG-TERM CURRENT USE OF HIGH RISK MEDICATION: ICD-10-CM

## 2017-08-29 DIAGNOSIS — L76.34 POSTPROCEDURAL SEROMA OF SKIN AND SUBCUTANEOUS TISSUE FOLLOWING OTHER PROCEDURE: ICD-10-CM

## 2017-08-29 PROCEDURE — 62369 ANAL SP INF PMP W/REPRG&FILL: CPT | Performed by: NURSE PRACTITIONER

## 2017-08-29 PROCEDURE — 99214 OFFICE O/P EST MOD 30 MIN: CPT | Performed by: NURSE PRACTITIONER

## 2017-08-29 NOTE — PROGRESS NOTES
CHIEF COMPLAINT    Since last office visit, pt states back pain is unchanged. He is here for pump refill. Pt's wife states he has been having more pain radiating from the back into his front thoracic region.    Subjective   Tay Geiger is a 72 y.o. male  who presents to the office for follow-up.He has a history of chronic low back pain.    He had a pain pump revision done by Dr. Yap on 6/30/17.  On 8/8/17 and he started noticing swelling of both incision sites. He was seen in the office 8/11/2016.  65 mL serous/serosanguineous fluid from the midline site was aspirated.  50 mL of serous fluid from the pump pocket site was aspirated. He returned to the office 8/14/2017 and the seromas had improved, still swelling present at that time. He has continued wearing an abdominal binder.      Today he is reporting that his pain is no better and is actually getting worse.  He has been taking the oral breakthrough hydrocodone every 4 hours rather than every 8 hours PRN.  He is reporting both worsening of thoracic pain/intercostal neuralgia pain but also pain around the midline surgical incision.  The swelling has increased since last office visit and seems to be making the pain worse, he reported significant relief after the initial aspiration, however the fluid has re accumulated and his pain is worsening.      No red flag symptoms present. No fever, chills, nausea, vomiting.      He also presents today for IT pump refill. Current Prialt rate is set at 8.495 mcg/day. He has previously been set at a rate as high as 10 mcg/day.      BP elevated today, patient has not taken any pain medication today. He will go home and do so and re-check and if still elevated will contact his PCP.  He denies headache, SOA, chest pain, visual changes.      Back Pain   This is a chronic problem. The current episode started more than 1 year ago. The problem occurs constantly. The problem is unchanged. The pain is present in the lumbar  "spine. The quality of the pain is described as aching. The pain radiates to the left foot. The pain is at a severity of 8/10. The pain is the same all the time. Exacerbated by: cold weather, prolonged walking. Associated symptoms include numbness (L leg,to ankle). Pertinent negatives include no bladder incontinence, bowel incontinence, chest pain (radiating pain from back), dysuria, fever, headaches or weakness. Risk factors include lack of exercise and sedentary lifestyle. He has tried analgesics (IT pump with Prialt, Lyrica) for the symptoms. The treatment provided moderate relief.      PEG Assessment   What number best describes your pain on average in the past week?9  What number best describes how, during the past week, pain has interfered with your enjoyment of life?10  What number best describes how, during the past week, pain has interfered with your general activity?  10    The following portions of the patient's history were reviewed and updated as appropriate: allergies, current medications, past family history, past medical history, past social history, past surgical history and problem list.    Review of Systems   Constitutional: Positive for activity change (decreased). Negative for appetite change, chills and fever.   Respiratory: Positive for apnea (\"mild\") and shortness of breath (upon exertion). Negative for cough, chest tightness and wheezing.    Cardiovascular: Negative for chest pain (radiating pain from back) and palpitations.   Gastrointestinal: Negative for bowel incontinence, constipation, diarrhea and nausea.   Genitourinary: Negative for bladder incontinence, difficulty urinating and dysuria.   Musculoskeletal: Positive for back pain (L leg pain as well). Negative for gait problem.   Neurological: Positive for numbness (L leg,to ankle). Negative for dizziness, weakness, light-headedness and headaches. Speech difficulty: speech mildly slurred today.   Psychiatric/Behavioral: Positive for " "confusion (memory deficit) and sleep disturbance. Negative for agitation, hallucinations and suicidal ideas. The patient is not nervous/anxious.        Vitals:    08/29/17 1131 08/29/17 1239   BP: (!) 189/104 (!) 200/100  Comment: manual   Pulse: 66 77   Resp: 16 16   Temp: 97.7 °F (36.5 °C) 97.1 °F (36.2 °C)   SpO2: 97% 95%   Weight: 229 lb (104 kg)    Height: 71\" (180.3 cm)    PainSc:   8    PainLoc: Back      Objective   Physical Exam   Constitutional: He is oriented to person, place, and time. He appears well-developed and well-nourished.   HENT:   Head: Normocephalic.   Eyes: Conjunctivae are normal.   Cardiovascular: Normal rate.    Pulmonary/Chest: Effort normal.   Musculoskeletal:        Lumbar back: He exhibits tenderness.   Neurological: He is alert and oriented to person, place, and time.   Skin: Skin is warm and dry.        Psychiatric: He has a normal mood and affect. His behavior is normal. Judgment and thought content normal.   Nursing note and vitals reviewed.    Intrathecal pump was interrogated in office today. Results are in chart.      The patient is currently at a dose of 8.5 mcg of Prialt per day.        Under sterile technique, the pump was accessed using a [employer] proprietary refill kit. The volume was withdrawn from the pump. The expected residual volume of the pump was 2 ml. The actual residual volume of the pump was 4 ml.      The pump was then refilled with 20 ml of Prialt at a concentration of 25 mcg/ml.       Patient tolerated procedure well. Minimal bleeding was noted. Pump site remains intact with no evidence of erythema or drainage.      Patient will return for pump refill when due or sooner if needed.         Aspiration of surgical incision  Midline thoracolumbar surgical incision cleaned and prepped aseptically. The areas were numbed with 0.5% Marcaine.  Next an 18 gague needle was used to aspirate fluid from the incision site.  55 mL serous fluid from the site.  Patient " tolerated procedure well without incident.      Assessment/Plan   Tay was seen today for back pain.    Diagnoses and all orders for this visit:    Other chronic pain    Spinal stenosis of lumbar region    Presence of intrathecal pump    Postprocedural seroma of skin and subcutaneous tissue following other procedure    Encounter for long-term current use of high risk medication    Intercostal neuralgia      --- Increase IT Prialt dose to 10 mcg/day, single bolus of 0.5mcg over 15 minutes administered without adverse effects, he was monitored during and after.    --- Midline post operative incisional seroma drained of 55 mL serous fluid, patient reported decreased pressure and improved pain following aspiration, tolerated procedure well  --- Just refilled hydrocodone 8/27/17, does not need refill today.    ---The urine drug screen confirmation from 7/25/2017 has been reviewed and the result is appropriate based on patient history and COURTNEY report  --- Follow-up as scheduled          COURTNEY REPORT  As part of the patient's treatment plan, I am prescribing controlled substances. The patient has been made aware of appropriate use of such medications, including potential risk of somnolence, limited ability to drive and/or work safely, and the potential for dependence or overdose. It has also bee made clear that these medications are for use by this patient only, without concomitant use of alcohol or other substances unless prescribed.     Patient has completed prescribing agreement detailing terms of continued prescribing of controlled substances, including monitoring COURTNEY reports, urine drug screening, and pill counts if necessary. The patient is aware that inappropriate use will results in cessation of prescribing such medications.    COURTNEY report has been reviewed and scanned into the patient's chart.    Date of last COURTNEY : 8/28/2017    History and physical exam exhibit continued safe and appropriate use of  controlled substances.    EMR Dragon/Transcription disclaimer:   Much of this encounter note is an electronic transcription/translation of spoken language to printed text. The electronic translation of spoken language may permit erroneous, or at times, nonsensical words or phrases to be inadvertently transcribed; Although I have reviewed the note for such errors, some may still exist.

## 2017-08-30 ENCOUNTER — TELEPHONE (OUTPATIENT)
Dept: PAIN MEDICINE | Facility: CLINIC | Age: 72
End: 2017-08-30

## 2017-09-06 ENCOUNTER — OFFICE VISIT (OUTPATIENT)
Dept: PAIN MEDICINE | Facility: CLINIC | Age: 72
End: 2017-09-06

## 2017-09-06 VITALS
BODY MASS INDEX: 32.48 KG/M2 | RESPIRATION RATE: 16 BRPM | WEIGHT: 232 LBS | OXYGEN SATURATION: 97 % | DIASTOLIC BLOOD PRESSURE: 89 MMHG | HEART RATE: 80 BPM | TEMPERATURE: 97.7 F | HEIGHT: 71 IN | SYSTOLIC BLOOD PRESSURE: 150 MMHG

## 2017-09-06 DIAGNOSIS — G58.8 INTERCOSTAL NEURALGIA: ICD-10-CM

## 2017-09-06 DIAGNOSIS — Z97.8 PRESENCE OF INTRATHECAL PUMP: ICD-10-CM

## 2017-09-06 DIAGNOSIS — L76.34 POSTPROCEDURAL SEROMA OF SKIN AND SUBCUTANEOUS TISSUE FOLLOWING OTHER PROCEDURE: ICD-10-CM

## 2017-09-06 DIAGNOSIS — G89.29 OTHER CHRONIC PAIN: Primary | ICD-10-CM

## 2017-09-06 DIAGNOSIS — R60.9: ICD-10-CM

## 2017-09-06 DIAGNOSIS — M48.061 SPINAL STENOSIS OF LUMBAR REGION: ICD-10-CM

## 2017-09-06 DIAGNOSIS — T81.89XD: ICD-10-CM

## 2017-09-06 PROCEDURE — 62367 ANALYZE SPINE INFUS PUMP: CPT | Performed by: ANESTHESIOLOGY

## 2017-09-06 PROCEDURE — 99213 OFFICE O/P EST LOW 20 MIN: CPT | Performed by: ANESTHESIOLOGY

## 2017-09-06 RX ORDER — OXYCODONE AND ACETAMINOPHEN 10; 325 MG/1; MG/1
1 TABLET ORAL EVERY 6 HOURS PRN
Qty: 120 TABLET | Refills: 0 | Status: SHIPPED | OUTPATIENT
Start: 2017-09-06 | End: 2017-10-12 | Stop reason: SDUPTHER

## 2017-09-06 NOTE — PROGRESS NOTES
"CHIEF COMPLAINT  Pt states L sided rib cage pain extending into L side mid back continues to increase,being \"agonizing\" at times.     Subjective   Tay Geiger is a 72 y.o. male  who presents to the office for follow-up.He has a history of seroma after Intrathecal pump replacement.    He notes that his IT pump use does help some with low back pain as well as intercostal neuralgia.  He is having more pain around the implant sites however.    On chart review, my colleagues have aspirated seroma x2, and I reviewed these notes.    In review, there was no seroma on 7-25-17 office visit but this formed by the 8-11-17.    Back Pain   This is a chronic problem. The current episode started more than 1 year ago. The problem occurs daily. The problem is unchanged (localized pain at the tight seroma sites). The pain is present in the lumbar spine. The quality of the pain is described as aching. The pain radiates to the left foot. The pain is moderate. The pain is the same all the time. Exacerbated by: cold weather, prolonged walking. Associated symptoms include numbness (L leg,to ankle). Pertinent negatives include no bladder incontinence, bowel incontinence, chest pain (radiating pain from back), dysuria, fever, headaches or weakness. Risk factors include lack of exercise and sedentary lifestyle. He has tried analgesics (IT pump with Prialt, Lyrica) for the symptoms. The treatment provided moderate relief.        PEG Assessment   What number best describes your pain on average in the past week?8  What number best describes how, during the past week, pain has interfered with your enjoyment of life?8  What number best describes how, during the past week, pain has interfered with your general activity?  8      The following portions of the patient's history were reviewed and updated as appropriate: allergies, current medications, past family history, past medical history, past social history, past surgical history and problem " "list.    Review of Systems   Constitutional: Positive for activity change (decreased). Negative for appetite change, chills and fever.   Respiratory: Positive for apnea (\"mild\") and shortness of breath (upon exertion). Negative for cough, chest tightness and wheezing.    Cardiovascular: Negative for chest pain (radiating pain from back) and palpitations.   Gastrointestinal: Negative for bowel incontinence, constipation, diarrhea and nausea.   Genitourinary: Negative for bladder incontinence, difficulty urinating and dysuria.   Musculoskeletal: Positive for back pain (L leg pain as well). Negative for gait problem.   Neurological: Positive for numbness (L leg,to ankle). Negative for dizziness, weakness, light-headedness and headaches. Speech difficulty: speech mildly slurred today.   Psychiatric/Behavioral: Positive for confusion (memory deficit) and sleep disturbance. Negative for agitation, hallucinations and suicidal ideas. The patient is not nervous/anxious.          Vitals:    09/06/17 1337   BP: 150/89   Pulse: 80   Resp: 16   Temp: 97.7 °F (36.5 °C)   SpO2: 97%   Weight: 232 lb (105 kg)   Height: 71\" (180.3 cm)   PainSc: 7  Comment: L rib cage to L side mid back pain ranges from 5-9/10   PainLoc: Back         Objective   Physical Exam    Swelling around the pump pocket and the midline incision.  Tender.  No erythema, no warmth.      Assessment/Plan   Tay was seen today for back pain.    Diagnoses and all orders for this visit:    Other chronic pain    Presence of intrathecal pump    Excessive incisional edema, subsequent encounter  -     Ambulatory Referral to Neurosurgery    Postprocedural seroma of skin and subcutaneous tissue following other procedure  -     Ambulatory Referral to Neurosurgery    Spinal stenosis of lumbar region    Intercostal neuralgia    Other orders  -     diclofenac (VOLTAREN) 1 % gel gel; Apply 4 g topically 4 (Four) Times a Day.  -     oxyCODONE-acetaminophen (PERCOCET)  MG " per tablet; Take 1 tablet by mouth Every 6 (Six) Hours As Needed for Severe Pain .        --- Follow-up for pump refill PRN    -- trial of Percocet today.  Discontinue Norco.    -- refill Voltaren gel.    -- referral to Dr. KELSI Nassar, for assistance with complicated postop course after intrathecal pump implant    -- need a follow up in office in 1 month as well               COURTNEY REPORT    As part of the patient's treatment plan, I am prescribing controlled substances. The patient has been made aware of appropriate use of such medications, including potential risk of somnolence, limited ability to drive and/or work safely, and the potential for dependence or overdose. It has also bee made clear that these medications are for use by this patient only, without concomitant use of alcohol or other substances unless prescribed.     Patient has completed prescribing agreement detailing terms of continued prescribing of controlled substances, including monitoring COURTNEY reports, urine drug screening, and pill counts if necessary. The patient is aware that inappropriate use will results in cessation of prescribing such medications.    COURTNEY report has been reviewed and scanned into the patient's chart.    Date of last COURTNEY : as above    History and physical exam exhibit continued safe and appropriate use of controlled substances.      EMR Dragon/Transcription disclaimer:   Much of this encounter note is an electronic transcription/translation of spoken language to printed text. The electronic translation of spoken language may permit erroneous, or at times, nonsensical words or phrases to be inadvertently transcribed; Although I have reviewed the note for such errors, some may still exist.           -    Interrogation of the intrathecal pump... This is a 40 ml pump, running Prialt 25mcg/ml at a rate of 10mcg/day.  The pump was last interrogated on 8-29-17.  Reservoir volume ERV is 16.8ml.  Refill interval is 39 days.  No  PTM.  Alarm date is 10-15-17.  YANIRA is 39 months.  No changes to the program.

## 2017-09-14 RX ORDER — TIZANIDINE 4 MG/1
8 TABLET ORAL EVERY 8 HOURS PRN
Qty: 270 TABLET | Refills: 1 | OUTPATIENT
Start: 2017-09-14

## 2017-09-14 RX ORDER — OXYCODONE AND ACETAMINOPHEN 10; 325 MG/1; MG/1
1 TABLET ORAL EVERY 6 HOURS PRN
Qty: 120 TABLET | Refills: 0 | Status: CANCELLED | OUTPATIENT
Start: 2017-09-14

## 2017-09-14 RX ORDER — TIZANIDINE 4 MG/1
8 TABLET ORAL EVERY 8 HOURS PRN
Qty: 720 TABLET | Refills: 1 | Status: SHIPPED | OUTPATIENT
Start: 2017-09-14 | End: 2017-10-04 | Stop reason: SDUPTHER

## 2017-09-14 NOTE — TELEPHONE ENCOUNTER
Medication Refill Request    Date of phone call: 2017    Medication being requested: Tizanidine 4 mg si tablets q8hrs  Qty: 720    Date of last visit: 17    Date of last refill: 17    COURTNEY up to date?: 17    Next Follow up?: 10/12/17    Any new pertinent information? (i.e, new medication allergies, new use of medications, change in patient's health or condition, non-compliance or inconsistency with prescribing agreement?): send to mail order [facility] Patient

## 2017-09-22 ENCOUNTER — HOSPITAL ENCOUNTER (EMERGENCY)
Facility: HOSPITAL | Age: 72
Discharge: HOME | End: 2017-09-22
Attending: EMERGENCY MEDICINE | Admitting: EMERGENCY MEDICINE

## 2017-09-22 ENCOUNTER — APPOINTMENT (OUTPATIENT)
Dept: CT IMAGING | Facility: HOSPITAL | Age: 72
End: 2017-09-22

## 2017-09-22 VITALS
HEART RATE: 52 BPM | HEIGHT: 71 IN | WEIGHT: 225 LBS | SYSTOLIC BLOOD PRESSURE: 105 MMHG | OXYGEN SATURATION: 98 % | TEMPERATURE: 98.6 F | RESPIRATION RATE: 16 BRPM | BODY MASS INDEX: 31.5 KG/M2 | DIASTOLIC BLOOD PRESSURE: 65 MMHG

## 2017-09-22 DIAGNOSIS — T50.905A MEDICATION SIDE EFFECTS, INITIAL ENCOUNTER: Primary | ICD-10-CM

## 2017-09-22 LAB
ALBUMIN SERPL-MCNC: 3.8 G/DL (ref 3.5–5.2)
ALBUMIN/GLOB SERPL: 1.7 G/DL
ALP SERPL-CCNC: 64 U/L (ref 40–129)
ALT SERPL W P-5'-P-CCNC: 20 U/L (ref 5–41)
ANION GAP SERPL CALCULATED.3IONS-SCNC: 13.8 MMOL/L
AST SERPL-CCNC: 22 U/L (ref 5–40)
BASOPHILS # BLD AUTO: 0.05 10*3/MM3 (ref 0–0.2)
BASOPHILS NFR BLD AUTO: 0.6 % (ref 0–2)
BILIRUB SERPL-MCNC: 0.4 MG/DL (ref 0.2–1.2)
BUN BLD-MCNC: 21 MG/DL (ref 8–23)
BUN/CREAT SERPL: 12.1 (ref 7–25)
CALCIUM SPEC-SCNC: 8.5 MG/DL (ref 8.8–10.5)
CHLORIDE SERPL-SCNC: 100 MMOL/L (ref 98–107)
CO2 SERPL-SCNC: 24.2 MMOL/L (ref 22–29)
CREAT BLD-MCNC: 1.74 MG/DL (ref 0.76–1.27)
DEPRECATED RDW RBC AUTO: 43.5 FL (ref 37–54)
EOSINOPHIL # BLD AUTO: 0.48 10*3/MM3 (ref 0.1–0.3)
EOSINOPHIL NFR BLD AUTO: 5.5 % (ref 0–4)
ERYTHROCYTE [DISTWIDTH] IN BLOOD BY AUTOMATED COUNT: 13.2 % (ref 11.5–14.5)
GFR SERPL CREATININE-BSD FRML MDRD: 39 ML/MIN/1.73
GLOBULIN UR ELPH-MCNC: 2.2 GM/DL
GLUCOSE BLD-MCNC: 139 MG/DL (ref 65–99)
GLUCOSE BLDC GLUCOMTR-MCNC: 141 MG/DL (ref 70–130)
HCT VFR BLD AUTO: 33.6 % (ref 42–52)
HGB BLD-MCNC: 11.4 G/DL (ref 14–18)
IMM GRANULOCYTES # BLD: 0.02 10*3/MM3 (ref 0–0.03)
IMM GRANULOCYTES NFR BLD: 0.2 % (ref 0–0.5)
LYMPHOCYTES # BLD AUTO: 2.44 10*3/MM3 (ref 0.6–4.8)
LYMPHOCYTES NFR BLD AUTO: 27.9 % (ref 20–45)
MCH RBC QN AUTO: 30.5 PG (ref 27–31)
MCHC RBC AUTO-ENTMCNC: 33.9 G/DL (ref 31–37)
MCV RBC AUTO: 89.8 FL (ref 80–94)
MONOCYTES # BLD AUTO: 0.66 10*3/MM3 (ref 0–1)
MONOCYTES NFR BLD AUTO: 7.6 % (ref 3–8)
NEUTROPHILS # BLD AUTO: 5.09 10*3/MM3 (ref 1.5–8.3)
NEUTROPHILS NFR BLD AUTO: 58.2 % (ref 45–70)
NRBC BLD MANUAL-RTO: 0 /100 WBC (ref 0–0)
PLATELET # BLD AUTO: 188 10*3/MM3 (ref 140–500)
PMV BLD AUTO: 10.4 FL (ref 7.4–10.4)
POTASSIUM BLD-SCNC: 3.6 MMOL/L (ref 3.5–5.2)
PROT SERPL-MCNC: 6 G/DL (ref 6–8.5)
RBC # BLD AUTO: 3.74 10*6/MM3 (ref 4.7–6.1)
SODIUM BLD-SCNC: 138 MMOL/L (ref 136–145)
WBC NRBC COR # BLD: 8.74 10*3/MM3 (ref 4.8–10.8)

## 2017-09-22 PROCEDURE — 82962 GLUCOSE BLOOD TEST: CPT

## 2017-09-22 PROCEDURE — 70450 CT HEAD/BRAIN W/O DYE: CPT

## 2017-09-22 PROCEDURE — 99284 EMERGENCY DEPT VISIT MOD MDM: CPT

## 2017-09-22 PROCEDURE — 93005 ELECTROCARDIOGRAM TRACING: CPT | Performed by: EMERGENCY MEDICINE

## 2017-09-22 PROCEDURE — 80053 COMPREHEN METABOLIC PANEL: CPT | Performed by: EMERGENCY MEDICINE

## 2017-09-22 PROCEDURE — 85025 COMPLETE CBC W/AUTO DIFF WBC: CPT | Performed by: EMERGENCY MEDICINE

## 2017-09-22 PROCEDURE — 93010 ELECTROCARDIOGRAM REPORT: CPT | Performed by: INTERNAL MEDICINE

## 2017-09-22 PROCEDURE — 96360 HYDRATION IV INFUSION INIT: CPT

## 2017-09-22 PROCEDURE — 99282 EMERGENCY DEPT VISIT SF MDM: CPT | Performed by: EMERGENCY MEDICINE

## 2017-09-22 RX ORDER — SODIUM CHLORIDE 0.9 % (FLUSH) 0.9 %
10 SYRINGE (ML) INJECTION AS NEEDED
Status: DISCONTINUED | OUTPATIENT
Start: 2017-09-22 | End: 2017-09-22 | Stop reason: HOSPADM

## 2017-09-22 RX ADMIN — SODIUM CHLORIDE 500 ML: 9 INJECTION, SOLUTION INTRAVENOUS at 09:46

## 2017-09-22 NOTE — ED NOTES
The patient has attempted to urinate and has not been able to void per urinal.  He is refusing a straight catheterization.  Will notify ER MD.  Family at the bedside.  The patient is more alert now than when he arrived.      Catrachita Garcia RN  09/22/17 7397

## 2017-09-22 NOTE — ED NOTES
"Chief Complaint   Patient presents with   • Syncope     Blood pressure (!) 88/60, pulse 62, temperature 98.6 °F (37 °C), temperature source Oral, resp. rate 16, height 71\" (180.3 cm), weight 225 lb (102 kg), SpO2 96 %.    The patient presents to the ER via [facility].  He reports having a syncopal episode this morning and another one witnessed by EMS en route.  He has an indwelling pain pump that has recently updated with a new medication called prolat.  ER MD at the bedside.  Placed the patient on the monitor and EKG done at the bedside.  The patient is AAOX4 but sluggish and lethargic.       Catrachita Garcia RN  09/22/17 7642    "

## 2017-09-22 NOTE — ED NOTES
The patient verbalized understanding of discharge instructions, medications and follow up.  Ambulated from the ER with a steady gait.  No further needs at this time.       Catrachita Garcia RN  09/22/17 1464

## 2017-09-22 NOTE — ED PROVIDER NOTES
Subjective   History of Present Illness  History of Present Illness    Chief complaint: Possible syncope    Location: Not applicable    Quality/Severity:  Moderate    Timing/Duration: Incident occurred prior to arrival    Modifying Factors: Symptoms started after the patient was given Prialt through his intrathecal pain pump.    Associated Symptoms: Somnolence    Narrative: The patient is a 72-year-old white male who presents as noted above.  As the patient is quite somnolent at this time history is difficult.  From what can be ascertained the patient's symptoms began after his wife gave him an intrathecal injection of Prialt this morning.  Again the details are somewhat sketchy, but the patient may have had syncope at a friend's house.  The patient attempted to drive but ultimately pulled over and arrived at our facility via EMS.  Awaiting the wife's arrival.    Review of Systems   Unable to perform ROS: Acuity of condition       Past Medical History:   Diagnosis Date   • Cardiovascular disease    • Disc degeneration, lumbar    • H/O hiatal hernia    • H/O scoliosis    • Hyperlipidemia    • Hypertension    • Low back pain    • Thoracic neuritis    • Thoracic or lumbosacral neuritis or radiculitis        Allergies   Allergen Reactions   • Lidocaine Other (See Comments)   • Morphine        Past Surgical History:   Procedure Laterality Date   • APPENDECTOMY     • BACK SURGERY     • CORONARY ARTERY BYPASS GRAFT     • HERNIA REPAIR      anterior Gastropexy//Repair of Paraesophageal   • OTHER SURGICAL HISTORY      Blood Vessel Repair Direct Leg   • OTHER SURGICAL HISTORY      Neurol Drug Infusion Implantation of Programmable Pump   • OTHER SURGICAL HISTORY      Neurological Zarephath/Pump Implantation   • RHIZOTOMY     • UMBILICAL HERNIA REPAIR         Family History   Problem Relation Age of Onset   • Heart attack Mother    • Cancer Mother    • Diabetes Sister    • Hypertension Sister    • Heart attack Sister    •  Stroke Sister        Social History     Social History   • Marital status:      Spouse name: N/A   • Number of children: N/A   • Years of education: N/A     Social History Main Topics   • Smoking status: Former Smoker   • Smokeless tobacco: None   • Alcohol use No   • Drug use: No   • Sexual activity: No     Other Topics Concern   • None     Social History Narrative   • None           Objective   Physical Exam   Constitutional: He is oriented to person, place, and time.   The patient is a very somnolent, obese, white male in no acute distress   HENT:   Head: Normocephalic and atraumatic.   Mouth/Throat: Oropharynx is clear and moist.   Eyes: Conjunctivae and EOM are normal. Pupils are equal, round, and reactive to light.   Neck: Normal range of motion. Neck supple. No thyromegaly present.   Cardiovascular: Normal rate, regular rhythm and normal heart sounds.    No murmur heard.  Pulmonary/Chest: Effort normal and breath sounds normal. No respiratory distress. He has no wheezes. He has no rales.   Abdominal: Soft. Bowel sounds are normal. He exhibits no distension. There is no tenderness.   Musculoskeletal: Normal range of motion. He exhibits no edema or tenderness.   Lymphadenopathy:     He has no cervical adenopathy.   Neurological: He is oriented to person, place, and time. No cranial nerve deficit.   Somnolent and slurred speech   Skin: Skin is warm and dry. No rash noted.   Nursing note and vitals reviewed.      Procedures         ED Course  ED Course   Comment By Time   09/22/17, 9:59 AM  Wife now here and states that the intrathecal pump is a preset and no adjustments were made this morning.  Patient did take Lyrica and Zanaflex this morning along with some ibuprofen and supplemental potassium.  No narcotics since 1600 hrs. yesterday. Shashi Davis MD 09/22 1001   09/22/17, 11:08 AM  Patient now more alert and awaiting head CT report. Shashi Davis MD 09/22 110   Final radiology report on  the head CT normal. Shashi Davis MD 09/22 1217   09/22/17, 12:20 PM  Patient much more alert and carries on an diligent conversation.  It was explained that no significant abnormalities of present to explain his somnolence.  It is suspected that taking the Zanaflex and Lyrica on an empty stomach may have played into today's presentation.  The patient advised rest today and the wife was advised to watch the patient carefully the rest of today and to return to our department for any problems. Shashi Davis MD 09/22 1222   09/22/17, 12:30 PM  EKG was reviewed at 0927 hours and showed a normal sinus rhythm of 58 bpm.  Inferior Q waves indicative of an old myocardial infarction, but ST segments and T waves unremarkable..  No ectopy Shashi Davis MD 09/22 1231                  MDM  Number of Diagnoses or Management Options  Medication side effects, initial encounter: new and does not require workup     Amount and/or Complexity of Data Reviewed  Clinical lab tests: ordered and reviewed  Tests in the radiology section of CPT®: ordered and reviewed    Risk of Complications, Morbidity, and/or Mortality  Presenting problems: high  Diagnostic procedures: high  Management options: moderate      Labs this visit  Lab Results (last 24 hours)     Procedure Component Value Units Date/Time    POC Glucose Fingerstick [[id number]]  (Abnormal) Collected:  09/22/17 0920    Specimen:  Blood Updated:  09/22/17 0927     Glucose 141 (H) mg/dL     Narrative:       Meter: [id number] : [id number] Toro Davis CNA    CBC & Differential [[id number]] Collected:  09/22/17 0942    Specimen:  Blood Updated:  09/22/17 0948    Narrative:       The following orders were created for panel order CBC & Differential.  Procedure                               Abnormality         Status                     ---------                               -----------         ------                     CBC Auto Differential[[id number]]         Abnormal            Final result                 Please view results for these tests on the individual orders.    Comprehensive Metabolic Panel [[id number]]  (Abnormal) Collected:  09/22/17 0942    Specimen:  Blood Updated:  09/22/17 1010     Glucose 139 (H) mg/dL      BUN 21 mg/dL      Creatinine 1.74 (H) mg/dL      Sodium 138 mmol/L      Potassium 3.6 mmol/L      Chloride 100 mmol/L      CO2 24.2 mmol/L      Calcium 8.5 (L) mg/dL      Total Protein 6.0 g/dL      Albumin 3.80 g/dL      ALT (SGPT) 20 U/L      AST (SGOT) 22 U/L      Alkaline Phosphatase 64 U/L      Total Bilirubin 0.4 mg/dL      eGFR Non African Amer 39 (L) mL/min/1.73      Globulin 2.2 gm/dL      A/G Ratio 1.7 g/dL      BUN/Creatinine Ratio 12.1     Anion Gap 13.8 mmol/L     Narrative:       The MDRD GFR formula is only valid for adults with stable renal function between ages 18 and 70.    CBC Auto Differential [[id number]]  (Abnormal) Collected:  09/22/17 0942    Specimen:  Blood Updated:  09/22/17 0948     WBC 8.74 10*3/mm3      RBC 3.74 (L) 10*6/mm3      Hemoglobin 11.4 (L) g/dL      Hematocrit 33.6 (L) %      MCV 89.8 fL      MCH 30.5 pg      MCHC 33.9 g/dL      RDW 13.2 %      RDW-SD 43.5 fl      MPV 10.4 fL      Platelets 188 10*3/mm3      Neutrophil % 58.2 %      Lymphocyte % 27.9 %      Monocyte % 7.6 %      Eosinophil % 5.5 (H) %      Basophil % 0.6 %      Immature Grans % 0.2 %      Neutrophils, Absolute 5.09 10*3/mm3      Lymphocytes, Absolute 2.44 10*3/mm3      Monocytes, Absolute 0.66 10*3/mm3      Eosinophils, Absolute 0.48 (H) 10*3/mm3      Basophils, Absolute 0.05 10*3/mm3      Immature Grans, Absolute 0.02 10*3/mm3      nRBC 0.0 /100 WBC         Prescribed on discharge             Medication List      Notice     No changes were made to your prescriptions during this visit.        All lab results, imaging results and other tests were reviewed by Shashi Davis MD and unless otherwise specified were found to be  unremarkable.      Final diagnoses:   Medication side effects, initial encounter            Shashi Davis MD  09/22/17 1221       Shashi Davis MD  09/22/17 1230

## 2017-10-04 ENCOUNTER — OFFICE VISIT (OUTPATIENT)
Dept: PAIN MEDICINE | Facility: CLINIC | Age: 72
End: 2017-10-04

## 2017-10-04 VITALS
DIASTOLIC BLOOD PRESSURE: 73 MMHG | HEIGHT: 71 IN | SYSTOLIC BLOOD PRESSURE: 113 MMHG | TEMPERATURE: 97.7 F | HEART RATE: 80 BPM | BODY MASS INDEX: 31.08 KG/M2 | WEIGHT: 222 LBS | RESPIRATION RATE: 16 BRPM | OXYGEN SATURATION: 96 %

## 2017-10-04 DIAGNOSIS — M54.16 LUMBAR RADICULOPATHY: ICD-10-CM

## 2017-10-04 DIAGNOSIS — L76.34 POSTPROCEDURAL SEROMA OF SKIN AND SUBCUTANEOUS TISSUE FOLLOWING OTHER PROCEDURE: ICD-10-CM

## 2017-10-04 DIAGNOSIS — R60.9: Primary | ICD-10-CM

## 2017-10-04 DIAGNOSIS — M51.36 DEGENERATION OF INTERVERTEBRAL DISC OF LUMBAR REGION: ICD-10-CM

## 2017-10-04 DIAGNOSIS — M48.062 SPINAL STENOSIS OF LUMBAR REGION WITH NEUROGENIC CLAUDICATION: ICD-10-CM

## 2017-10-04 DIAGNOSIS — G58.8 INTERCOSTAL NEURALGIA: ICD-10-CM

## 2017-10-04 DIAGNOSIS — T81.89XD: Primary | ICD-10-CM

## 2017-10-04 DIAGNOSIS — Z97.8 PRESENCE OF INTRATHECAL PUMP: ICD-10-CM

## 2017-10-04 PROCEDURE — 99212 OFFICE O/P EST SF 10 MIN: CPT | Performed by: ANESTHESIOLOGY

## 2017-10-04 PROCEDURE — 62367 ANALYZE SPINE INFUS PUMP: CPT | Performed by: ANESTHESIOLOGY

## 2017-10-04 RX ORDER — FUROSEMIDE 80 MG
80 TABLET ORAL DAILY
COMMUNITY
End: 2018-03-22

## 2017-10-04 RX ORDER — POTASSIUM CHLORIDE 750 MG/1
10 TABLET, EXTENDED RELEASE ORAL 4 TIMES DAILY
COMMUNITY

## 2017-10-04 RX ORDER — TIZANIDINE 4 MG/1
8 TABLET ORAL EVERY 6 HOURS PRN
Qty: 720 TABLET | Refills: 1 | Status: SHIPPED | OUTPATIENT
Start: 2017-10-04 | End: 2018-01-02

## 2017-10-04 NOTE — PROGRESS NOTES
"CHIEF COMPLAINT  Pt continues to have basically unchanged L intercostal/L back pain, being on the average of 8/10 since 9/6/17 pump refill.  Pt reportedly is continuing with zanaflex 8 mg QID despite most recent prescription being Q 8h .    Subjective   Tayesther Geiger is a 72 y.o. male  who presents to the office for follow-up.He has a history of severe intercostal neuralgia and also lumbar spine pain, treated by intrathecal ziconotide, and with painful flareups we have also needed to resort to some oral pain medications.  .      History of Present Illness     PEG Assessment   What number best describes your pain on average in the past week?8  What number best describes how, during the past week, pain has interfered with your enjoyment of life?8  What number best describes how, during the past week, pain has interfered with your general activity?  9      The following portions of the patient's history were reviewed and updated as appropriate: allergies, current medications, past family history, past medical history, past social history, past surgical history and problem list.    Review of Systems   Constitutional: Positive for activity change (decreased). Negative for appetite change, chills and fever.   Respiratory: Positive for apnea (\"mild\") and shortness of breath (upon exertion). Negative for cough, chest tightness and wheezing.    Cardiovascular: Positive for leg swelling (lasix 80mg qd-swelling has decreased). Negative for chest pain (radiating pain from back) and palpitations.   Gastrointestinal: Negative for constipation, diarrhea and nausea.   Genitourinary: Negative for difficulty urinating and dysuria.   Musculoskeletal: Positive for back pain (L leg pain as well). Negative for gait problem.   Neurological: Positive for numbness (L rib area). Negative for dizziness, weakness, light-headedness and headaches. Speech difficulty: speech mildly slurred today.   Psychiatric/Behavioral: Positive for confusion " "(memory deficit) and sleep disturbance. Negative for agitation, hallucinations and suicidal ideas. The patient is not nervous/anxious.          Vitals:    10/04/17 1121   BP: 113/73   Pulse: 80   Resp: 16   Temp: 97.7 °F (36.5 °C)   SpO2: 96%   Weight: 222 lb (101 kg)   Height: 71\" (180.3 cm)   PainSc: 8  Comment: L sided LBP ranges from 4-8/10   PainLoc: Back         Objective   Physical Exam    The pump pocket is not swollen and not indurated.  The midline area is moderately swollen but not fluctuant.  Not red nor inflammed, not indurated.      Assessment/Plan   Tay was seen today for back pain.    Diagnoses and all orders for this visit:    Excessive incisional edema, subsequent encounter    Presence of intrathecal pump  -     Case Request    Spinal stenosis of lumbar region with neurogenic claudication    Postprocedural seroma of skin and subcutaneous tissue following other procedure    Degeneration of intervertebral disc of lumbar region    Lumbar radiculopathy    Intercostal neuralgia    Other orders  -     tiZANidine (ZANAFLEX) 4 MG tablet; Take 2 tablets by mouth Every 6 (Six) Hours As Needed for Muscle Spasms for up to 90 days. For muscle spasms        --- Follow-up next week for pump refill  -- interrogation today... He continues at 10mcg prialt per day, low reservoir is 10-15-17.  ERV is 5.6ml.  YANIRA 38 months.      -- continue sparing use of Percocet which is needed due to moderate midline back pain  -- continue Lyrica  -- continue Tizanidine    -- we need a second opinion & assistance with his incisional edema.  I hope he can see Dr. Nassar soon; I reached out to him to discuss             COURTNEY REPORT    As part of the patient's treatment plan, I am prescribing controlled substances. The patient has been made aware of appropriate use of such medications, including potential risk of somnolence, limited ability to drive and/or work safely, and the potential for dependence or overdose. It has also bee " made clear that these medications are for use by this patient only, without concomitant use of alcohol or other substances unless prescribed.     Patient has completed prescribing agreement detailing terms of continued prescribing of controlled substances, including monitoring COURTNEY reports, urine drug screening, and pill counts if necessary. The patient is aware that inappropriate use will results in cessation of prescribing such medications.    COURTNEY report has been reviewed and scanned into the patient's chart.    Date of last COURTNEY : as above    History and physical exam exhibit continued safe and appropriate use of controlled substances.      EMR Dragon/Transcription disclaimer:   Much of this encounter note is an electronic transcription/translation of spoken language to printed text. The electronic translation of spoken language may permit erroneous, or at times, nonsensical words or phrases to be inadvertently transcribed; Although I have reviewed the note for such errors, some may still exist.           Intrathecal pump was interrogated in office today. Results are in chart.    The patient is currently at a dose of 10mcg of prialt per day. There is not a set PTM bolus.   low reservoir is 10-15-17.  ERV is 5.6ml.  YANIRA 38 months.     Patient will return for pump refill when due or sooner if needed.

## 2017-10-12 ENCOUNTER — OFFICE VISIT (OUTPATIENT)
Dept: PAIN MEDICINE | Facility: CLINIC | Age: 72
End: 2017-10-12

## 2017-10-12 VITALS
BODY MASS INDEX: 31.5 KG/M2 | HEART RATE: 71 BPM | OXYGEN SATURATION: 94 % | WEIGHT: 225 LBS | SYSTOLIC BLOOD PRESSURE: 118 MMHG | RESPIRATION RATE: 16 BRPM | DIASTOLIC BLOOD PRESSURE: 79 MMHG | TEMPERATURE: 97.7 F | HEIGHT: 71 IN

## 2017-10-12 DIAGNOSIS — M48.062 SPINAL STENOSIS OF LUMBAR REGION WITH NEUROGENIC CLAUDICATION: ICD-10-CM

## 2017-10-12 DIAGNOSIS — M51.26 DISPLACEMENT OF LUMBAR INTERVERTEBRAL DISC WITHOUT MYELOPATHY: ICD-10-CM

## 2017-10-12 DIAGNOSIS — M51.36 DEGENERATION OF INTERVERTEBRAL DISC OF LUMBAR REGION: ICD-10-CM

## 2017-10-12 DIAGNOSIS — M54.16 LUMBAR RADICULOPATHY: ICD-10-CM

## 2017-10-12 DIAGNOSIS — G89.29 OTHER CHRONIC PAIN: Primary | ICD-10-CM

## 2017-10-12 DIAGNOSIS — Z97.8 PRESENCE OF INTRATHECAL PUMP: ICD-10-CM

## 2017-10-12 PROCEDURE — 99213 OFFICE O/P EST LOW 20 MIN: CPT | Performed by: NURSE PRACTITIONER

## 2017-10-12 PROCEDURE — 62369 ANAL SP INF PMP W/REPRG&FILL: CPT | Performed by: NURSE PRACTITIONER

## 2017-10-12 RX ORDER — OXYCODONE AND ACETAMINOPHEN 10; 325 MG/1; MG/1
1 TABLET ORAL EVERY 6 HOURS PRN
Qty: 120 TABLET | Refills: 0 | Status: SHIPPED | OUTPATIENT
Start: 2017-10-12 | End: 2017-11-22 | Stop reason: SDUPTHER

## 2017-10-12 NOTE — PROGRESS NOTES
CHIEF COMPLAINT  Pt states LBP is ranging between 4-8/10,with oxycodone taken 1-2x daily.    Subjective   Muenster CELIA Geiger is a 72 y.o. male  who presents to the office for follow-up.He has a history of chronic low back pain. His pain is unchanged since last office visit. He is currently at prialt 10 mcg/day. Denies any side effects from this. Declines alteration of currently daily dose. Also continues with Percocet 10/325 1-2/day. His wife monitors the Percocet. He is noticing the midline seroma is not hurting as badly. His pump pocket in left low back is better. Is wondering about pump dye study. This was previously ordered due to abnormal findings related to reservoir volumes. ADL's by self. Denies any bowel or bladder incontinence.     Expected residual volume today was 2.5ml and actual was 3.5ml. With previous pump refill, the expected was 2 and actual was 4 ml. Prior to that, the expected was 1.2 ml and actal was 2.5 ml.      Back Pain   This is a chronic problem. The current episode started more than 1 year ago. The problem occurs daily. The problem is unchanged. The pain is present in the lumbar spine. The quality of the pain is described as aching. The pain radiates to the left foot. The pain is at a severity of 4/10. The pain is moderate. The pain is the same all the time. Exacerbated by: cold weather, prolonged walking. Associated symptoms include numbness (L rib area). Pertinent negatives include no bladder incontinence, bowel incontinence, chest pain (radiating pain from back), dysuria, fever, headaches or weakness. Risk factors include lack of exercise and sedentary lifestyle. He has tried analgesics (IT pump with Prialt, Lyrica, Percocet.) for the symptoms. The treatment provided moderate relief.      PEG Assessment   What number best describes your pain on average in the past week?7  What number best describes how, during the past week, pain has interfered with your enjoyment of life?8  What number  "best describes how, during the past week, pain has interfered with your general activity?  8    The following portions of the patient's history were reviewed and updated as appropriate: allergies, current medications, past family history, past medical history, past social history, past surgical history and problem list.    Review of Systems   Constitutional: Negative for activity change (decreased restricted), appetite change, chills and fever.   Respiratory: Positive for apnea (\"mild\") and shortness of breath (upon exertion). Negative for cough, chest tightness and wheezing.    Cardiovascular: Positive for leg swelling (lasix 80mg qd-swelling has decreased). Negative for chest pain (radiating pain from back) and palpitations.   Gastrointestinal: Negative for bowel incontinence, constipation, diarrhea and nausea.   Genitourinary: Negative for bladder incontinence, difficulty urinating and dysuria.   Musculoskeletal: Positive for back pain (L leg pain as well). Negative for gait problem.   Neurological: Positive for numbness (L rib area). Negative for dizziness, weakness, light-headedness and headaches. Speech difficulty: speech mildly slurred today.   Psychiatric/Behavioral: Positive for confusion (memory deficit) and sleep disturbance (mild). Negative for agitation, hallucinations and suicidal ideas. The patient is not nervous/anxious.        Vitals:    10/12/17 1031   BP: 118/79   Pulse: 71   Resp: 16   Temp: 97.7 °F (36.5 °C)   SpO2: 94%   Weight: 225 lb (102 kg)   Height: 71\" (180.3 cm)   PainSc: 4  Comment: LBP ranges from 4-8/10     Objective   Physical Exam   Constitutional: He is oriented to person, place, and time. He appears well-developed and well-nourished.   HENT:   Head: Normocephalic.   Eyes: Conjunctivae are normal.   Cardiovascular: Normal rate.    Pulmonary/Chest: Effort normal.   Musculoskeletal:        Lumbar back: He exhibits tenderness.   Neurological: He is alert and oriented to person, place, " and time.   Skin: Skin is warm and dry.        Psychiatric: He has a normal mood and affect. His behavior is normal. Judgment and thought content normal.   Nursing note and vitals reviewed.    Intrathecal pump was interrogated in office today. Results are in chart.      The patient is currently at a dose of 8.5 mcg of Prialt per day.        Under sterile technique, the pump was accessed using a Triage proprietary refill kit. The volume was withdrawn from the pump. The expected residual volume of the pump was 2.5 ml. The actual residual volume of the pump was 3.5ml.      The pump was then refilled with 20 ml of Prialt at a concentration of 25 mcg/ml.       Patient tolerated procedure well. Minimal bleeding was noted. Pump site remains intact with no evidence of erythema or drainage.      Patient will return for pump refill when due or sooner if needed.     Assessment/Plan   Tay was seen today for back pain.    Diagnoses and all orders for this visit:    Other chronic pain  -     Cancel: Urine Drug Screen Confirmation - Urine, Urine, Clean Catch  -     Cancel: POC Urine Drug Screen, Triage    Degeneration of intervertebral disc of lumbar region    Displacement of lumbar intervertebral disc without myelopathy    Lumbar radiculopathy    Spinal stenosis of lumbar region with neurogenic claudication    Presence of intrathecal pump      --- The urine drug screen confirmation from 1-27-17 has been reviewed and the result is appropriate based on patient history and COURTNEY report  --- Pump refill. No changes today.  --- declines refill of Percocet today.  --- Proceed with pump dye study. Will again place order. Multiple refills with discrepancy between expected and actual reservoir volume.  --- Follow-up for pump dye study.       COURTNEY REPORT    As part of the patient's treatment plan, I am prescribing controlled substances. The patient has been made aware of appropriate use of such medications, including potential  risk of somnolence, limited ability to drive and/or work safely, and the potential for dependence or overdose. It has also bee made clear that these medications are for use by this patient only, without concomitant use of alcohol or other substances unless prescribed.     Patient has completed prescribing agreement detailing terms of continued prescribing of controlled substances, including monitoring COURTNEY reports, urine drug screening, and pill counts if necessary. The patient is aware that inappropriate use will results in cessation of prescribing such medications.    COURTNEY report has been reviewed and scanned into the patient's chart.    Date of last COURTNEY : 10-11-17    History and physical exam exhibit continued safe and appropriate use of controlled substances.      EMR Dragon/Transcription disclaimer:   Much of this encounter note is an electronic transcription/translation of spoken language to printed text. The electronic translation of spoken language may permit erroneous, or at times, nonsensical words or phrases to be inadvertently transcribed; Although I have reviewed the note for such errors, some may still exist.

## 2017-10-18 ENCOUNTER — TELEPHONE (OUTPATIENT)
Dept: PAIN MEDICINE | Facility: CLINIC | Age: 72
End: 2017-10-18

## 2017-10-18 NOTE — TELEPHONE ENCOUNTER
Spoke to pt and told him per Nya PELAEZ we need him to come in and have fluid aspirated and sent to test for CSF before he sees Dr. Nassar. He understands this and is coming in tomorrow afternoon.

## 2017-10-23 ENCOUNTER — OFFICE VISIT (OUTPATIENT)
Dept: PAIN MEDICINE | Facility: CLINIC | Age: 72
End: 2017-10-23

## 2017-10-23 VITALS
TEMPERATURE: 97.9 F | WEIGHT: 227 LBS | DIASTOLIC BLOOD PRESSURE: 110 MMHG | SYSTOLIC BLOOD PRESSURE: 200 MMHG | RESPIRATION RATE: 18 BRPM | OXYGEN SATURATION: 95 % | HEIGHT: 71 IN | HEART RATE: 94 BPM | BODY MASS INDEX: 31.78 KG/M2

## 2017-10-23 DIAGNOSIS — L76.34 POSTPROCEDURAL SEROMA OF SKIN AND SUBCUTANEOUS TISSUE FOLLOWING OTHER PROCEDURE: Primary | ICD-10-CM

## 2017-10-23 DIAGNOSIS — Z97.8 PRESENCE OF INTRATHECAL PUMP: ICD-10-CM

## 2017-10-23 DIAGNOSIS — G58.8 INTERCOSTAL NEURALGIA: ICD-10-CM

## 2017-10-23 DIAGNOSIS — G89.29 OTHER CHRONIC PAIN: ICD-10-CM

## 2017-10-23 DIAGNOSIS — M48.062 SPINAL STENOSIS OF LUMBAR REGION WITH NEUROGENIC CLAUDICATION: ICD-10-CM

## 2017-10-23 PROCEDURE — 86335 IMMUNFIX E-PHORSIS/URINE/CSF: CPT | Performed by: NURSE PRACTITIONER

## 2017-10-23 PROCEDURE — 10160 PNXR ASPIR ABSC HMTMA BULLA: CPT | Performed by: NURSE PRACTITIONER

## 2017-10-23 NOTE — PROGRESS NOTES
CHIEF COMPLAINT  The spot on my back    Subjective   Tay Geiger is a 72 y.o. male  who presents to the office for follow-up.He has a history of chronic Intercostal neuralgia. Also had IT pump revision and has had issue with seroma. Is here today for fluid removal and testing of fluid. He is pending evaluation by Dr. Nassar(neurosurgeon). Dr. Nassar contacted Dr. Yap and recommended a beta 2 transferring sample from seroma. This will be obtained in office today. He is noticing the whole area is decreasing and is not quite as tender. Is awaiting on pump dye study.     Complains of pain in his back. Today his pain is 6/10VAS. Continues with medication regimen as previously prescribed. No changes. ADL's by self.    Reviewed the procedure at length with the patient.  Included in the review was expectations, complications, risk and benefits.The procedure was described in detail and the risks, benefits and alternatives were discussed with the patient (including but not limited to: bleeding, infection, nerve damage, worsening of pain, inability to perform injection, paralysis, seizures, and death) who agreed to proceed.  Discussed the potential for sedation if warranted/wanted.  Questions were answered and in a way the patient could understand.  Patient verbalized understanding and wishes to proceed.  This intervention will be ordered.    Discussed with patient and wife anesthesia of area. Patient declined and wishes to proceed with only sample being obtained today.     Back Pain   This is a chronic problem. The current episode started more than 1 year ago. The problem occurs daily. The pain is present in the lumbar spine. The quality of the pain is described as aching. The pain radiates to the left foot. The pain is at a severity of 6/10. The pain is moderate. The pain is the same all the time. Exacerbated by: cold weather, prolonged walking. Associated symptoms include numbness (L rib area). Pertinent negatives  "include no bladder incontinence, bowel incontinence, chest pain (radiating pain from back), dysuria, fever, headaches or weakness. Risk factors include lack of exercise and sedentary lifestyle. He has tried analgesics (IT pump with Prialt, Lyrica, Percocet.) for the symptoms. The treatment provided moderate relief.        The following portions of the patient's history were reviewed and updated as appropriate: allergies, current medications, past family history, past medical history, past social history, past surgical history and problem list.    Review of Systems   Constitutional: Negative for fever.   Respiratory: Negative for chest tightness and shortness of breath.    Cardiovascular: Negative for chest pain (radiating pain from back).   Gastrointestinal: Negative for bowel incontinence.   Genitourinary: Negative for bladder incontinence and dysuria.   Musculoskeletal: Positive for back pain (L leg pain as well).   Skin: Negative for color change.   Neurological: Positive for numbness (L rib area). Negative for weakness and headaches.   Psychiatric/Behavioral: Negative for confusion and sleep disturbance. The patient is not nervous/anxious.        Vitals:    10/23/17 1441   BP: (!) 200/110   Pulse: 94   Resp: 18   Temp: 97.9 °F (36.6 °C)   SpO2: 95%   Weight: 227 lb (103 kg)   Height: 71\" (180.3 cm)     Repeat manual BP by myself was 180/100.  Objective   Physical Exam   Constitutional: He is oriented to person, place, and time. He appears well-developed and well-nourished.   HENT:   Head: Normocephalic.   Eyes: Conjunctivae are normal.   Cardiovascular: Normal rate.    Pulmonary/Chest: Effort normal.   Musculoskeletal:        Lumbar back: He exhibits tenderness.   Neurological: He is alert and oriented to person, place, and time.   Skin: Skin is warm and dry.        Psychiatric: He has a normal mood and affect. His behavior is normal. Judgment and thought content normal.   Nursing note and vitals " reviewed.      The procedure including risks, expectations, possible complications, and benefits were explained to the patient in detail. Verbal and written consent were obtained.      The convex area over the lumbar spine was identified and marked.    The area was prepped with alcohol.    Using sterile technique a 18 gauge 1 1/2 inch needle was placed in the center of the seroma. Immediately aspirated 4 ml of clear serous fluid. The syringe was switched under sterile technique. Another 18 ml was removed without manipulation of the area as the patient did not want any numbing agents  The needle was removed intact.      The patient tolerated the procedure well without any apparent difficulties or complication.      No blood loss.      The patient reported feeling relief prior to leaving the office.          Assessment/Plan   Diagnoses and all orders for this visit:    Postprocedural seroma of skin and subcutaneous tissue following other procedure  -     Beta 2 Transferrin - Body Fluid, Spine, Lumbar; Future    Other chronic pain    Spinal stenosis of lumbar region with neurogenic claudication    Intercostal neuralgia    Presence of intrathecal pump      --- 18 ml of fluid drained from seroma. 4 ml sent for beta 2 trasnferrin testing as per dr. Yap and Dr. Nassar order. Reviewed reason for this test today. Patient and wife verbalized understanding and agreed with plan of care. Reviewed the procedure at length with the patient.  Included in the review was expectations, complications, risk and benefits.The procedure was described in detail and the risks, benefits and alternatives were discussed with the patient (including but not limited to: bleeding, infection, nerve damage, worsening of pain, inability to perform injection, paralysis, seizures, and death) who agreed to proceed.  Discussed the potential for sedation if warranted/wanted.  Questions were answered and in a way the patient could understand.  Patient  verbalized understanding and wishes to proceed.  This intervention will be ordered.  --- Continue with regimen with no changes at this time.  --- Continue with plans for pump dye study  --- Follow-up as scheduled.       COURTNEY REPORT      COURTNEY report has been reviewed and scanned into the patient's chart.    Date of last COURTNEY : 10-20-17          EMR Dragon/Transcription disclaimer:   Much of this encounter note is an electronic transcription/translation of spoken language to printed text. The electronic translation of spoken language may permit erroneous, or at times, nonsensical words or phrases to be inadvertently transcribed; Although I have reviewed the note for such errors, some may still exist.

## 2017-10-27 LAB — B2 TRANSFERRIN FLD QL: ABNORMAL

## 2017-10-30 ENCOUNTER — DOCUMENTATION (OUTPATIENT)
Dept: PAIN MEDICINE | Facility: CLINIC | Age: 72
End: 2017-10-30

## 2017-10-30 DIAGNOSIS — T85.610S MALFUNCTION OF INTRATHECAL INFUSION PUMP, SEQUELA: Primary | ICD-10-CM

## 2017-10-30 NOTE — PROGRESS NOTES
Transferrin was positive from seroma, so it seems that his catheter is leaky.  Will forego the dye study for a pump revision surgery under general anesthesia.

## 2017-11-14 ENCOUNTER — TELEPHONE (OUTPATIENT)
Dept: PAIN MEDICINE | Facility: CLINIC | Age: 72
End: 2017-11-14

## 2017-11-17 ENCOUNTER — DOCUMENTATION (OUTPATIENT)
Dept: PAIN MEDICINE | Facility: CLINIC | Age: 72
End: 2017-11-17

## 2017-11-17 ENCOUNTER — OUTSIDE FACILITY SERVICE (OUTPATIENT)
Dept: PAIN MEDICINE | Facility: CLINIC | Age: 72
End: 2017-11-17

## 2017-11-17 PROCEDURE — 62362 IMPLANT SPINE INFUSION PUMP: CPT | Performed by: ANESTHESIOLOGY

## 2017-11-17 PROCEDURE — 62350 IMPLANT SPINAL CANAL CATH: CPT | Performed by: ANESTHESIOLOGY

## 2017-11-22 RX ORDER — OXYCODONE AND ACETAMINOPHEN 10; 325 MG/1; MG/1
1 TABLET ORAL EVERY 6 HOURS PRN
Qty: 120 TABLET | Refills: 0 | Status: SHIPPED | OUTPATIENT
Start: 2017-11-22 | End: 2018-03-22

## 2017-11-22 NOTE — PROGRESS NOTES
Intrathecal Drug Delivery System Revision  [facility]      Preop Dx: Presence of intrathecal pump,seroma, lumbar spinal stenosis with neurogenic claudication, intercostal neuralgia  Postop Dx: Same as preop dx    Neuromodulation System: Medtronic, with continued use of his previously implanted Synchromed II 40ml pump  Product Implanted:   Ascendia cathether (#2982, #[id number])    Catheter...   entering into the L1-2 interspace, advanced to a point with the distal tip at the T8 vertebral level, in the intrathecal space    Pump pocket site: Left Lumbar Flank    Preprocedure Discussion with Patient:  Risks and complications were discussed with the patient prior to starting the procedure and informed consent was obtained.  We discussed various topics including but not limited to bleeding, infection, injury, allergic reactions, spinal cord and/or neural injury, implant site pain, post procedural site soreness, equipment malfunction including but not limited to catheter fracture or migration or risk of high or low levels of medication delivery, risk of overdose & coma & death, risk of the lack of pain relief, and risk of worsening clinical picture.    Surgeon:     Chapin Yap MD    Reason for procedure:  After previous pump revision, he had a postprocedural seroma.  Fluid was drawn from the pump pocket and beta transferrin was positive.  Therefore was high cause for concern of a leak from the previous catheter splicing.  Therefore the entire catheter was removed to down to the pump and replaced with a new catheter.  As always been using higher than average doses of ziconotide but even with increases of the dose he has been experiencing less pain control and I think this is indicative of the leak in the catheter attachment.    Sedation:  General Anesthesia with ETT  Antibiotics:   Cefazolin 2g IV immediately prior to incision    Description of Procedure:    After obtaining informed consent, IV access  had been obtained by nursing staff in the preoperative area.  The patient was transported to the operative suite and was placed in a prone position.  Appropriate padding was placed.  Anesthesia care and cardiopulmonary monitoring maintained by member of the anesthesiology team throughout the procedure.  Perioperative antibodies were given prior to incision per routine as indicated in the anesthesia record and indicated above.  The patient's spine was cleansed appropriately with routine cleansing, and then prepped in a sterile routine fashion.  The area was then draped in sterile routine fashion.     The pump pocket was opened and routine fashion and the pump catheter attachment was detached and the pump was placed a side on the back table and cleansed with bacitracin irrigant solution and then wrapped and placed aside in a sterile fashion.    The midline of the patient's spine was identified and marked.  The skin and subcutaneous tissue were anesthetized with appropriate amounts of local anesthetic solution.  A midline incision was made starting about 1 vertebral level below the intended interspace as noted above, and extended 1 1/2-2 inches caudad.  The incision was extended down to the lumbodorsal fascia with judicious use of electrocautery.  The entire area was irrigated with copious amounts of solution.  The pancreas on the previous catheters were detached and the fluid of the seroma was suctioned.  The entire length the catheter was removed.  A pursestring suture was placed prior to pulling the catheter and it was tightened down to secure the previous insertion site.    Needle entry site was about 1 1/2-2 vertebral levels below the intended interlaminar space for epidural access.  The entry point was medial to the pedicles, and inserted at acute angles of less than 45° and in to the interlaminar space noted above.  Loss-of-resistance technique was utilized to identify entry into the epidural space.  Aspiration  was negative.  The catheter was readied, and then the needle was advanced 1-2 more millimeters to puncture the dura.  The catheter was inserted in the needle as noted above to the aforementioned target.  Lateral fluoroscopic view was utilized to confirm proper placement in the intrathecal space.    The needle was removed slowly from the insertion site with care not to advance to retract catheter tip position.  AP fluoroscopic imaging was checked sequentially to confirm no gross changes in position had occurred.  The catheter was secured to the underlying tissue using a proprietary locking anchor and nonabsorbable sutures.  Once more in AP fluoroscopic view was checked to confirm final lead placement after anchoring was completed.  The incision site was irrigated with copious amounts of irrigant solution x2.    As noted above, the pump pocket site was readdressed.  The pocket was then irrigated with copious amounts of irrigant solution ×2.      The catheter was tunneled from the midline incision to the nearest aspect of the pump pocket using the proprietary tunneling device.   The catheter was trimmed as appropriate and the catheter length was noted in the pump programming.   The catheter was connected to the pump in routine fashion using the locking pump catheter extension, and locked in place.  Catheter function was tested with positive aspiration from the side port confirming free CSF flow, and was noted to be functioning appropriately.  Excess catheter lead length was coiled beneath the pump battery and the pump battery was placed into the pocket.  The skin and subcutaneous tissues were easily opposable without any stress or duress on the overlying tissues.    The pump pocket incision was closed with 2-0 Vicryl interrupted sutures for the underlying layer and Stapling Device for Skin Closure.  The midline incision was closed with 2-0 Vicryl interrupted sutures for the underlying layer, 2-0 Vicryl running suture  for the deep layer and Stapling Device for Skin Closure.  The incisions were dressed with gauze with tape.        Estimated Blood Loss: minimal  Specimens:   Removed the previous catheter and anchors  Complications:  No complications were noted.    Tolerance and discharge condition:    The patient tolerated the procedure well and was awakened from anesthesia without incident.  The patient was transported to the recovery area without difficulties.  Further device testing and programming was performed by the device representative in the recovery area as necessary.  The patient was again cautioned not to drive at this time and avoid strenuous activities and to avoid reaching overhead and to avoid bending and avoid lifting objects over 5 pounds.  The patient was discharged home under the care of family members in stable and satisfactory condition.      Plan of care:    The patient was given our standard instruction sheet and will resume all medications as per the medication reconciliation sheet.  The patient will return to my office at the appropriate time scheduled in about one business day with the  device representative for further device programming and education about device function if necessary.  The patient will also present to my office in 10-14 days for a postoperative wound check.    The patient understands that there is any signs of complication, bleeding, infection, fever, chills, increasing back pain or spine pain, any neurologic deficit, or any other concerning finding, that the patient of a family member should call my office at [phone] at any time to access the answering service.      INITIAL DEVICE SETTINGS:  The total implanted length was 85.8 cm.  The intrathecal pump continued to infuse ziconotide that was at start concentration 25 mcg/mL.  His dose was decreased to 8 µg per day and a single bolus was administered of 5 µg over 12 minutes, therefore basically refilling the length of the  catheter so that therapy could begin immediately.  Refill dates of 11/28/2017 is noted.

## 2017-11-27 ENCOUNTER — OFFICE VISIT (OUTPATIENT)
Dept: PAIN MEDICINE | Facility: CLINIC | Age: 72
End: 2017-11-27

## 2017-11-27 VITALS
OXYGEN SATURATION: 96 % | HEART RATE: 81 BPM | TEMPERATURE: 97.6 F | WEIGHT: 222 LBS | BODY MASS INDEX: 31.08 KG/M2 | SYSTOLIC BLOOD PRESSURE: 126 MMHG | RESPIRATION RATE: 16 BRPM | DIASTOLIC BLOOD PRESSURE: 80 MMHG | HEIGHT: 71 IN

## 2017-11-27 DIAGNOSIS — Z97.8 PRESENCE OF INTRATHECAL PUMP: ICD-10-CM

## 2017-11-27 DIAGNOSIS — G89.29 OTHER CHRONIC PAIN: Primary | ICD-10-CM

## 2017-11-27 DIAGNOSIS — Z79.899 ENCOUNTER FOR LONG-TERM CURRENT USE OF HIGH RISK MEDICATION: ICD-10-CM

## 2017-11-27 DIAGNOSIS — M51.36 DEGENERATION OF INTERVERTEBRAL DISC OF LUMBAR REGION: ICD-10-CM

## 2017-11-27 DIAGNOSIS — M48.062 SPINAL STENOSIS OF LUMBAR REGION WITH NEUROGENIC CLAUDICATION: ICD-10-CM

## 2017-11-27 DIAGNOSIS — G58.8 INTERCOSTAL NEURALGIA: ICD-10-CM

## 2017-11-27 NOTE — PROGRESS NOTES
"CHIEF COMPLAINT  Pt is having significant reduction of LBP,L sided rib cage pain since 11/17/17 pump revision.  Pt states has not had oxycodone in 6 days.    Subjective   Tay Geiger is a 72 y.o. male  who presents to the office for follow-up of procedure.  He completed a IT pump catheter revision   on  11-17-17 performed by Dr. Yap for management of low back and intercostal neuralgia pain. Patient reports significant relief from the procedure.  He is noticing that his rib pain is significantly improved. Also notes that his low back pain is almost gone. He has had some incisional pain, but reports overall that he is doing much better. Did take some PErcocet post-opertively, but none in last 6 days. Wife brought bottle and has several still in there.     Complains of pain in his middle back/ribs. Today his pain is 2/10VAS. Describes the pain as nearly continuous but improved. COntinues with Prialt. Dr. Yap did decrease dose to 8 mcg/day. No PTM. Denies any side effects from this. Reports improved pain control. ADL\"s by self.    Denies any current tenderness, drainage or warmth to area. Denies fever, chills, headache, stiff neck.    Back Pain   This is a chronic problem. The current episode started more than 1 year ago. The problem occurs daily. Progression since onset: improved since last office visit. The pain is present in the lumbar spine. The quality of the pain is described as aching. The pain radiates to the left foot. The pain is at a severity of 2/10. The pain is moderate. The pain is the same all the time. Exacerbated by: cold weather, prolonged walking. Associated symptoms include numbness (L rib area). Pertinent negatives include no bladder incontinence, bowel incontinence, chest pain (radiating pain from back), dysuria, fever, headaches or weakness. Risk factors include lack of exercise and sedentary lifestyle. He has tried analgesics (IT pump with Prialt, Lyrica, Percocet.) for the symptoms. " "The treatment provided moderate relief.      PEG Assessment   What number best describes your pain on average in the past week?4  What number best describes how, during the past week, pain has interfered with your enjoyment of life?7  What number best describes how, during the past week, pain has interfered with your general activity?  7    The following portions of the patient's history were reviewed and updated as appropriate: allergies, current medications, past family history, past medical history, past social history, past surgical history and problem list.    Review of Systems   Constitutional: Positive for activity change (mildly increased). Negative for appetite change and fever.   Respiratory: Negative for chest tightness and shortness of breath.    Cardiovascular: Negative for chest pain (radiating pain from back).   Gastrointestinal: Negative for bowel incontinence, constipation, diarrhea and nausea.   Genitourinary: Negative for bladder incontinence, difficulty urinating and dysuria.   Musculoskeletal: Positive for back pain (L leg pain as well).   Skin: Negative for color change.   Neurological: Positive for numbness (L rib area). Negative for weakness and headaches.   Psychiatric/Behavioral: Negative for confusion, sleep disturbance and suicidal ideas. The patient is not nervous/anxious.        Vitals:    11/27/17 1125   BP: 126/80   Pulse: 81   Resp: 16   Temp: 97.6 °F (36.4 °C)   SpO2: 96%   Weight: 222 lb (101 kg)   Height: 71\" (180.3 cm)   PainSc: 2  Comment: L sidedLBP,L rib cage pain ranges from 2-6/10   PainLoc: Back     Objective   Physical Exam   Constitutional: He is oriented to person, place, and time. He appears well-developed and well-nourished.   HENT:   Head: Normocephalic.   Eyes: Conjunctivae are normal.   Cardiovascular: Normal rate.    Pulmonary/Chest: Effort normal.   Musculoskeletal:        Lumbar back: He exhibits tenderness.   Neurological: He is alert and oriented to person, " place, and time.   Skin: Skin is warm and dry.        Psychiatric: He has a normal mood and affect. His behavior is normal. Judgment and thought content normal.   Nursing note and vitals reviewed.      Intrathecal pump was interrogated in office today. Results are in chart.      The patient is currently at a dose of 8.0(changed from surgery) mcg of Prialt per day.        Under sterile technique, the pump was accessed using a [employer] proprietary refill kit. The volume was withdrawn from the pump. The expected residual volume of the pump was 2.2 ml. The actual residual volume of the pump was 5.0 ml.      The pump was then refilled with 20 ml of Prialt at a concentration of 25 mcg/ml.       Patient tolerated procedure well. Minimal bleeding was noted. Pump site remains intact with no evidence of erythema or drainage.      Patient will return for pump refill when due or sooner if needed.     Assessment/Plan   Tay was seen today for back pain.    Diagnoses and all orders for this visit:    Other chronic pain    Spinal stenosis of lumbar region with neurogenic claudication    Degeneration of intervertebral disc of lumbar region    Intercostal neuralgia    Presence of intrathecal pump    Encounter for long-term current use of high risk medication    --- Staples removed. Steri-strips applied.  --- Continue with medication regimen.  --- Pump refill performed. NO changes to  Rate at this time.   --- Will monitor volume discrepancy. Hopefully this will rectify itself since catheter revision and patient reporting improved pain relief. However, if discrepancies continue, will need pump dye study.  ---  Reviewed signs and symptoms of infection, including but not limited to-- fever, chills, stiff neck, headache, flu-like symptoms, increased tenderness and redness to incisions, as well as drainage or swelling. If patient notices this, he is to call office immediately. Patient verbalized understanding.  --- Follow-up 2 weeks  for incision check. Wife will monitor and call if anything changes.        COURTNEY REPORT    As part of the patient's treatment plan, I am prescribing controlled substances. The patient has been made aware of appropriate use of such medications, including potential risk of somnolence, limited ability to drive and/or work safely, and the potential for dependence or overdose. It has also bee made clear that these medications are for use by this patient only, without concomitant use of alcohol or other substances unless prescribed.     Patient has completed prescribing agreement detailing terms of continued prescribing of controlled substances, including monitoring COURTNEY reports, urine drug screening, and pill counts if necessary. The patient is aware that inappropriate use will results in cessation of prescribing such medications.    COURTNEY report has been reviewed and scanned into the patient's chart.    Date of last COURTNEY : 11-22-17    History and physical exam exhibit continued safe and appropriate use of controlled substances.       EMR Dragon/Transcription disclaimer:   Much of this encounter note is an electronic transcription/translation of spoken language to printed text. The electronic translation of spoken language may permit erroneous, or at times, nonsensical words or phrases to be inadvertently transcribed; Although I have reviewed the note for such errors, some may still exist.

## 2017-12-06 RX ORDER — PREGABALIN 100 MG/1
100 CAPSULE ORAL 3 TIMES DAILY
Qty: 270 CAPSULE | Refills: 1 | Status: SHIPPED | OUTPATIENT
Start: 2017-12-06 | End: 2018-03-29 | Stop reason: SDUPTHER

## 2017-12-06 NOTE — TELEPHONE ENCOUNTER
Medication Refill Request    Date of phone call: 12/4/17    Medication being requested: Lyrica 100 sig: 3xday  Qty: 270    Date of last visit: 11/27/17    Date of last refill: 8/9/17    COURTNEY up to date?: 11/22/17    Next Follow up?: 1/24/18    Any new pertinent information? (i.e, new medication allergies, new use of medications, change in patient's health or condition, non-compliance or inconsistency with prescribing agreement?): please print - needs to be faxed in for a 90 day supply to [facility]

## 2018-01-24 ENCOUNTER — OFFICE VISIT (OUTPATIENT)
Dept: PAIN MEDICINE | Facility: CLINIC | Age: 73
End: 2018-01-24

## 2018-01-24 VITALS
HEART RATE: 80 BPM | RESPIRATION RATE: 16 BRPM | TEMPERATURE: 97.7 F | HEIGHT: 71 IN | DIASTOLIC BLOOD PRESSURE: 98 MMHG | SYSTOLIC BLOOD PRESSURE: 177 MMHG | BODY MASS INDEX: 31.81 KG/M2 | WEIGHT: 227.2 LBS | OXYGEN SATURATION: 96 %

## 2018-01-24 DIAGNOSIS — G58.8 INTERCOSTAL NEURALGIA: ICD-10-CM

## 2018-01-24 DIAGNOSIS — Z97.8 PRESENCE OF INTRATHECAL PUMP: ICD-10-CM

## 2018-01-24 DIAGNOSIS — M48.062 SPINAL STENOSIS OF LUMBAR REGION WITH NEUROGENIC CLAUDICATION: ICD-10-CM

## 2018-01-24 DIAGNOSIS — G89.29 OTHER CHRONIC PAIN: Primary | ICD-10-CM

## 2018-01-24 PROBLEM — T85.610A MALFUNCTION OF INTRATHECAL INFUSION PUMP: Status: RESOLVED | Noted: 2017-05-25 | Resolved: 2018-01-24

## 2018-01-24 PROCEDURE — 62369 ANAL SP INF PMP W/REPRG&FILL: CPT | Performed by: NURSE PRACTITIONER

## 2018-01-24 RX ORDER — POTASSIUM CHLORIDE 750 MG/1
TABLET, FILM COATED, EXTENDED RELEASE ORAL
COMMUNITY
Start: 2018-01-03 | End: 2018-03-22 | Stop reason: SDUPTHER

## 2018-01-24 RX ORDER — FUROSEMIDE 40 MG/1
TABLET ORAL
COMMUNITY
Start: 2017-12-27

## 2018-01-24 NOTE — PROGRESS NOTES
CHIEF COMPLAINT  F/U back pain. Here for pump refill. Pt states his pain has been improved since his surgery in November 2017.    Subjective   Tay Geiger is a 72 y.o. male  who presents to the office for follow-up.He has a history of chronic pain due to history of intercostal neuralgia and also low back pain. He had a pump revision in November 2017 and is noticing significant improvement. Both he and his wife report the improvement. Denies any side effects from the regimen. Is happy with his current dosing. Complains of pain in his back. Today his pain is 3/10VAS. Notes no swelling around pump site either. No further issues with seroma.     Back Pain   This is a chronic problem. The current episode started more than 1 year ago. The problem occurs daily. Progression since onset: improved since last office visit. The pain is present in the lumbar spine. The quality of the pain is described as aching. The pain radiates to the left foot. The pain is at a severity of 3/10. The pain is moderate. The pain is the same all the time. Exacerbated by: cold weather, prolonged walking. Associated symptoms include numbness (L rib area). Pertinent negatives include no bladder incontinence, bowel incontinence, chest pain (radiating pain from back), dysuria, fever, headaches or weakness. Risk factors include lack of exercise and sedentary lifestyle. He has tried analgesics (IT pump with Prialt, Lindsey) for the symptoms. The treatment provided moderate relief.      PEG Assessment   What number best describes your pain on average in the past week?3  What number best describes how, during the past week, pain has interfered with your enjoyment of life?3  What number best describes how, during the past week, pain has interfered with your general activity?  3    The following portions of the patient's history were reviewed and updated as appropriate: allergies, current medications, past family history, past medical history, past  "social history, past surgical history and problem list.    Review of Systems   Constitutional: Negative for activity change, appetite change and fever.   Respiratory: Negative for chest tightness and shortness of breath.    Cardiovascular: Negative for chest pain (radiating pain from back).   Gastrointestinal: Negative for bowel incontinence, constipation, diarrhea and nausea.   Genitourinary: Negative for bladder incontinence, difficulty urinating and dysuria.   Musculoskeletal: Positive for back pain (L leg pain as well).   Skin: Negative for color change.   Neurological: Positive for numbness (L rib area). Negative for weakness and headaches.   Psychiatric/Behavioral: Negative for agitation, confusion, sleep disturbance and suicidal ideas. The patient is not nervous/anxious.        Vitals:    01/24/18 1107   BP: 177/98   Pulse: 80   Resp: 16   Temp: 97.7 °F (36.5 °C)   SpO2: 96%   Weight: 103 kg (227 lb 3.2 oz)   Height: 180.3 cm (70.98\")   PainSc:   3   PainLoc: Back     Objective   Physical Exam   Constitutional: He is oriented to person, place, and time. He appears well-developed and well-nourished.   HENT:   Head: Normocephalic.   Eyes: Conjunctivae are normal.   Cardiovascular: Normal rate.    Pulmonary/Chest: Effort normal.   Musculoskeletal:        Lumbar back: He exhibits tenderness.   Neurological: He is alert and oriented to person, place, and time.   Skin: Skin is warm and dry.        Psychiatric: He has a normal mood and affect. His behavior is normal. Judgment and thought content normal.   Nursing note and vitals reviewed.    Intrathecal pump was interrogated in office today. Results are in chart.      The patient is currently at a dose of 8.00 mcg of Prialt per day.        Under sterile technique, the pump was accessed using a [employer] proprietary refill kit. The volume was withdrawn from the pump. The expected residual volume of the pump was 1.5 ml. The actual residual volume of the pump was 2.1 " ml.      The pump was then refilled with 20 ml of Prialt at a concentration of 25 mcg/ml.       Patient tolerated procedure well. Minimal bleeding was noted. Pump site remains intact with no evidence of erythema or drainage.      Patient will return for pump refill when due or sooner if needed.       Assessment/Plan   Tay was seen today for back pain.    Diagnoses and all orders for this visit:    Other chronic pain    Spinal stenosis of lumbar region with neurogenic claudication    Intercostal neuralgia    Presence of intrathecal pump      --- Refill pump. No changes at this time.  --- Follow-up for pump refill or sooner if needed.         COURTNEY REPORT  COURTNEY report has been reviewed and scanned into the patient's chart.    Date of last COURTNEY : 1-23-18          EMR Dragon/Transcription disclaimer:   Much of this encounter note is an electronic transcription/translation of spoken language to printed text. The electronic translation of spoken language may permit erroneous, or at times, nonsensical words or phrases to be inadvertently transcribed; Although I have reviewed the note for such errors, some may still exist.

## 2018-03-13 RX ORDER — CITALOPRAM 20 MG/1
20 TABLET ORAL DAILY
Qty: 90 TABLET | Refills: 2 | Status: SHIPPED | OUTPATIENT
Start: 2018-03-13

## 2018-03-13 NOTE — TELEPHONE ENCOUNTER
Patients wife called and states he needs a refill for Citalopram sent to [facility]

## 2018-03-22 ENCOUNTER — OFFICE VISIT (OUTPATIENT)
Dept: PAIN MEDICINE | Facility: CLINIC | Age: 73
End: 2018-03-22

## 2018-03-22 VITALS
DIASTOLIC BLOOD PRESSURE: 97 MMHG | WEIGHT: 232 LBS | HEIGHT: 71 IN | OXYGEN SATURATION: 95 % | HEART RATE: 78 BPM | TEMPERATURE: 97.7 F | SYSTOLIC BLOOD PRESSURE: 152 MMHG | RESPIRATION RATE: 16 BRPM | BODY MASS INDEX: 32.48 KG/M2

## 2018-03-22 DIAGNOSIS — M48.062 SPINAL STENOSIS OF LUMBAR REGION WITH NEUROGENIC CLAUDICATION: ICD-10-CM

## 2018-03-22 DIAGNOSIS — Z97.8 PRESENCE OF INTRATHECAL PUMP: ICD-10-CM

## 2018-03-22 DIAGNOSIS — G89.29 OTHER CHRONIC PAIN: Primary | ICD-10-CM

## 2018-03-22 DIAGNOSIS — G58.8 INTERCOSTAL NEURALGIA: ICD-10-CM

## 2018-03-22 DIAGNOSIS — Z79.899 ENCOUNTER FOR LONG-TERM CURRENT USE OF HIGH RISK MEDICATION: ICD-10-CM

## 2018-03-22 PROCEDURE — 62369 ANAL SP INF PMP W/REPRG&FILL: CPT | Performed by: NURSE PRACTITIONER

## 2018-03-22 NOTE — PROGRESS NOTES
CHIEF COMPLAINT  Pt states L sidedLBP is significantly reduced since 1/24/18 office visit.    Subjective   Tay CELIA Geiger is a 72 y.o. male  who presents to the office for follow-up.He has a history of low and mid back pain.  Presents today for IT pump refill.  Doing well with current dose of Prialt.  He reports improved back pain since last office visit, denies adverse reactions.      Back Pain   This is a chronic problem. The current episode started more than 1 year ago. The problem occurs daily. Progression since onset: improved since last office visit. The pain is present in the lumbar spine. The quality of the pain is described as aching. The pain radiates to the left foot. The pain is at a severity of 2/10. The pain is moderate. The pain is the same all the time. Exacerbated by: cold weather, prolonged walking. Associated symptoms include numbness (L rib area). Pertinent negatives include no bladder incontinence, bowel incontinence, chest pain (radiating pain from back), dysuria, fever, headaches or weakness. Risk factors include lack of exercise and sedentary lifestyle. He has tried analgesics (IT pump with Prialt, Lindsey) for the symptoms. The treatment provided significant relief.      PEG Assessment   What number best describes your pain on average in the past week?3  What number best describes how, during the past week, pain has interfered with your enjoyment of life?3  What number best describes how, during the past week, pain has interfered with your general activity?  3    The following portions of the patient's history were reviewed and updated as appropriate: allergies, current medications, past family history, past medical history, past social history, past surgical history and problem list.    Review of Systems   Constitutional: Negative for activity change, appetite change and fever.   Respiratory: Negative for chest tightness and shortness of breath.    Cardiovascular: Negative for chest pain  "(radiating pain from back).   Gastrointestinal: Negative for bowel incontinence, constipation, diarrhea and nausea.   Genitourinary: Negative for bladder incontinence, difficulty urinating and dysuria.   Musculoskeletal: Positive for back pain (L leg pain as well).   Skin: Negative for color change.   Neurological: Positive for numbness (L rib area). Negative for dizziness, weakness and headaches.   Psychiatric/Behavioral: Negative for agitation, confusion, sleep disturbance and suicidal ideas. The patient is not nervous/anxious.        Vitals:    03/22/18 1108   BP: 152/97   Pulse: 78   Resp: 16   Temp: 97.7 °F (36.5 °C)   SpO2: 95%   Weight: 105 kg (232 lb)   Height: 180.3 cm (70.98\")   PainSc: 2  Comment: L side LBP ranges from 2-7/10   PainLoc: Back         Objective   Physical Exam   Constitutional: He is oriented to person, place, and time. He appears well-developed and well-nourished.   HENT:   Head: Normocephalic.   Eyes: Conjunctivae are normal.   Cardiovascular: Normal rate.    Pulmonary/Chest: Effort normal.   Neurological: He is alert and oriented to person, place, and time.   Skin: Skin is warm and dry.        Psychiatric: He has a normal mood and affect. His behavior is normal.   Nursing note and vitals reviewed.      Intrathecal pump was interrogated in office today. Results are in chart.      The patient is currently at a dose of 8.00 mcg of Prialt per day.        Under sterile technique, the pump was accessed using a [employer] proprietary refill kit. The volume was withdrawn from the pump. The expected residual volume of the pump was 1.8 ml. The actual residual volume of the pump was 3.4ml.      The pump was then refilled with 20 ml of Prialt at a concentration of 25 mcg/ml.       Patient tolerated procedure well. Minimal bleeding was noted. Pump site remains intact with no evidence of erythema or drainage.      Patient will return for pump refill when due or sooner if needed.     Assessment/Plan "   Tay was seen today for back pain.    Diagnoses and all orders for this visit:    Other chronic pain    Presence of intrathecal pump    Spinal stenosis of lumbar region with neurogenic claudication    Intercostal neuralgia    Encounter for long-term current use of high risk medication      --- IT pump refilled, No changes made today  --- Follow-up next pump refill          COURTNEY REPORT    COURTNEY report has been reviewed and scanned into the patient's chart.    Date of last COURTNEY : 3/21/18  EMR Dragon/Transcription disclaimer:   Much of this encounter note is an electronic transcription/translation of spoken language to printed text. The electronic translation of spoken language may permit erroneous, or at times, nonsensical words or phrases to be inadvertently transcribed; Although I have reviewed the note for such errors, some may still exist.

## 2018-03-29 RX ORDER — PREGABALIN 100 MG/1
100 CAPSULE ORAL 3 TIMES DAILY
Qty: 270 CAPSULE | Refills: 1 | OUTPATIENT
Start: 2018-03-29

## 2018-04-18 ENCOUNTER — TELEPHONE (OUTPATIENT)
Dept: PAIN MEDICINE | Facility: CLINIC | Age: 73
End: 2018-04-18

## 2018-05-16 RX ORDER — TIZANIDINE HYDROCHLORIDE 4 MG/1
4 CAPSULE, GELATIN COATED ORAL 4 TIMES DAILY PRN
Qty: 720 CAPSULE | Refills: 0 | Status: SHIPPED | OUTPATIENT
Start: 2018-05-16

## 2018-05-16 NOTE — TELEPHONE ENCOUNTER
Medication Refill Request    Date of phone call: 5-15-18    Medication being requested: Tizanidine 4 mg si tablets q6hrs  Qty: 720    Date of last visit: 3-22-18    Date of last refill: 17    COURTNEY up to date?:3-21-18     Next Follow up?: 18    Any new pertinent information? (i.e, new medication allergies, new use of medications, change in patient's health or condition, non-compliance or inconsistency with prescribing agreement?): Pt asked to be sent to [facility]

## 2018-05-18 ENCOUNTER — TELEPHONE (OUTPATIENT)
Dept: PAIN MEDICINE | Facility: CLINIC | Age: 73
End: 2018-05-18

## 2018-05-18 ENCOUNTER — OFFICE VISIT (OUTPATIENT)
Dept: PAIN MEDICINE | Facility: CLINIC | Age: 73
End: 2018-05-18

## 2018-05-18 VITALS
RESPIRATION RATE: 16 BRPM | TEMPERATURE: 97.9 F | BODY MASS INDEX: 32.62 KG/M2 | DIASTOLIC BLOOD PRESSURE: 81 MMHG | HEART RATE: 91 BPM | OXYGEN SATURATION: 95 % | WEIGHT: 233 LBS | HEIGHT: 71 IN | SYSTOLIC BLOOD PRESSURE: 120 MMHG

## 2018-05-18 DIAGNOSIS — Z97.8 PRESENCE OF INTRATHECAL PUMP: ICD-10-CM

## 2018-05-18 DIAGNOSIS — G58.8 INTERCOSTAL NEURALGIA: ICD-10-CM

## 2018-05-18 DIAGNOSIS — M51.36 DEGENERATION OF INTERVERTEBRAL DISC OF LUMBAR REGION: ICD-10-CM

## 2018-05-18 DIAGNOSIS — M48.062 SPINAL STENOSIS OF LUMBAR REGION WITH NEUROGENIC CLAUDICATION: ICD-10-CM

## 2018-05-18 DIAGNOSIS — G89.29 OTHER CHRONIC PAIN: Primary | ICD-10-CM

## 2018-05-18 PROCEDURE — 62369 ANAL SP INF PMP W/REPRG&FILL: CPT | Performed by: NURSE PRACTITIONER

## 2018-05-18 PROCEDURE — 99213 OFFICE O/P EST LOW 20 MIN: CPT | Performed by: NURSE PRACTITIONER

## 2018-05-18 RX ORDER — HYDROCODONE BITARTRATE AND ACETAMINOPHEN 10; 325 MG/1; MG/1
1 TABLET ORAL EVERY 8 HOURS PRN
Qty: 30 TABLET | Refills: 0 | Status: SHIPPED | OUTPATIENT
Start: 2018-05-18

## 2018-05-18 NOTE — PROGRESS NOTES
CHIEF COMPLAINT  Pt states L sided LBP extending into L  anterior rib cage area has increased over the last 4 weeks.  Pt has been more active with mowing his yard (riding mower).    Subjective   Tay Geiger is a 72 y.o. male  who presents to the office for follow-up.He has a history of chronic back and rib pain.  He presents today for IT pump refill.  Overall doing well with current dose of Prialt, has noticed some slight worsening of pain since getting outside on the riding .  Has taken a couple of hydrocodone left over from August of last year.  This has helped, takes sparingly without side effects.  VOltaren gel also helps but does not last.      Back Pain   This is a chronic problem. The current episode started more than 1 year ago. The problem occurs daily. Progression since onset: improved since last office visit. The pain is present in the lumbar spine. The quality of the pain is described as aching. The pain radiates to the left foot. The pain is at a severity of 3/10. The pain is moderate. The pain is the same all the time. Exacerbated by: cold weather, prolonged walking. Associated symptoms include numbness (L rib area). Pertinent negatives include no bladder incontinence, bowel incontinence, chest pain (radiating pain from back), dysuria, fever, headaches or weakness. Risk factors include lack of exercise and sedentary lifestyle. He has tried analgesics (IT pump with Prialt, Lindsey) for the symptoms. The treatment provided significant relief.      PEG Assessment   What number best describes your pain on average in the past week?5  What number best describes how, during the past week, pain has interfered with your enjoyment of life?6  What number best describes how, during the past week, pain has interfered with your general activity?  5    The following portions of the patient's history were reviewed and updated as appropriate: allergies, current medications, past family history, past  "medical history, past social history, past surgical history and problem list.    Review of Systems   Constitutional: Positive for activity change (increased). Negative for appetite change and fever.   Respiratory: Negative for chest tightness and shortness of breath (upon exertion).    Cardiovascular: Negative for chest pain (radiating pain from back).   Gastrointestinal: Negative for bowel incontinence, constipation, diarrhea and nausea.   Genitourinary: Negative for bladder incontinence, difficulty urinating and dysuria.   Musculoskeletal: Positive for back pain (L leg pain as well).   Skin: Negative for color change.   Neurological: Positive for numbness (L rib area). Negative for dizziness, weakness and headaches.   Psychiatric/Behavioral: Negative for agitation, confusion (memory deficit), sleep disturbance and suicidal ideas. The patient is not nervous/anxious.        Vitals:    05/18/18 1416   BP: 120/81   Pulse: 91   Resp: 16   Temp: 97.9 °F (36.6 °C)   SpO2: 95%   Weight: 106 kg (233 lb)   Height: 180.3 cm (70.98\")   PainSc: 5  Comment: LBP on L side ranges from 3-8/10   PainLoc: Back       Objective   Physical Exam   Constitutional: He is oriented to person, place, and time. He appears well-developed and well-nourished.   HENT:   Head: Normocephalic.   Eyes: Conjunctivae are normal.   Cardiovascular: Normal rate.    Pulmonary/Chest: Effort normal.   Neurological: He is alert and oriented to person, place, and time.   Skin: Skin is warm and dry.        Psychiatric: He has a normal mood and affect. His behavior is normal.   Nursing note and vitals reviewed.    Intrathecal pump was interrogated in office today. Results are in chart.      The patient is currently at a dose of 8.00 mcg of Prialt per day.        Under sterile technique, the pump was accessed using a [employer] proprietary refill kit. The volume was withdrawn from the pump. The expected residual volume of the pump was 1.8 ml. The actual residual " volume of the pump was 3.0ml.      The pump was then refilled with 20 ml of Prialt at a concentration of 25 mcg/ml.       Patient tolerated procedure well. Minimal bleeding was noted. Pump site remains intact with no evidence of erythema or drainage.      Patient will return for pump refill when due or sooner if needed.     YANIRA 30 months     Assessment/Plan   Tay was seen today for back pain.    Diagnoses and all orders for this visit:    Other chronic pain    Presence of intrathecal pump    Spinal stenosis of lumbar region with neurogenic claudication    Degeneration of intervertebral disc of lumbar region    Intercostal neuralgia    Other orders  -     HYDROcodone-acetaminophen (NORCO)  MG per tablet; Take 1 tablet by mouth Every 8 (Eight) Hours As Needed for Severe Pain .      --- Refill Hydrocodone PRN for severe breakthrough pain. Patient appears stable with current regimen. No adverse effects. Regarding continuation of opioids, there is no evidence of aberrant behavior or any red flags.  The patient continues with appropriate response to opioid therapy. ADL's remain intact by self.   --- Pump refilled, no changes made today  --- The urine drug screen confirmation from 7/28/17 has been reviewed and the result is appropriate based on patient history and COURTNEY report  --- Trial of Flector Patches.    --- Follow-up next pump fill          COURTNEY REPORT  As part of the patient's treatment plan, I am prescribing controlled substances. The patient has been made aware of appropriate use of such medications, including potential risk of somnolence, limited ability to drive and/or work safely, and the potential for dependence or overdose. It has also bee made clear that these medications are for use by this patient only, without concomitant use of alcohol or other substances unless prescribed.     Patient has completed prescribing agreement detailing terms of continued prescribing of controlled substances,  including monitoring COURTNEY reports, urine drug screening, and pill counts if necessary. The patient is aware that inappropriate use will results in cessation of prescribing such medications.    COURTNEY report has been reviewed and scanned into the patient's chart.    As the clinician, I personally reviewed the COURTNEY from 5/17/2018 while the patient was in the office today.    History and physical exam exhibit continued safe and appropriate use of controlled substances.    EMR Dragon/Transcription disclaimer:   Much of this encounter note is an electronic transcription/translation of spoken language to printed text. The electronic translation of spoken language may permit erroneous, or at times, nonsensical words or phrases to be inadvertently transcribed; Although I have reviewed the note for such errors, some may still exist.

## 2018-05-18 NOTE — TELEPHONE ENCOUNTER
Called pt since he missed his apt at 11 AM for a pump refill. I spoke to his wife who said they forgot about it. I checked with Keyanna LICONA who gave the ok for them to come in any time today to be seen and have his pump refilled.

## 2018-07-13 ENCOUNTER — RESULTS ENCOUNTER (OUTPATIENT)
Dept: PAIN MEDICINE | Facility: CLINIC | Age: 73
End: 2018-07-13

## 2018-07-13 ENCOUNTER — OFFICE VISIT (OUTPATIENT)
Dept: PAIN MEDICINE | Facility: CLINIC | Age: 73
End: 2018-07-13

## 2018-07-13 VITALS
WEIGHT: 228 LBS | HEIGHT: 71 IN | BODY MASS INDEX: 31.92 KG/M2 | SYSTOLIC BLOOD PRESSURE: 117 MMHG | RESPIRATION RATE: 16 BRPM | OXYGEN SATURATION: 94 % | HEART RATE: 82 BPM | TEMPERATURE: 98.6 F | DIASTOLIC BLOOD PRESSURE: 80 MMHG

## 2018-07-13 DIAGNOSIS — G89.29 OTHER CHRONIC PAIN: Primary | ICD-10-CM

## 2018-07-13 DIAGNOSIS — G89.29 OTHER CHRONIC PAIN: ICD-10-CM

## 2018-07-13 DIAGNOSIS — Z97.8 PRESENCE OF INTRATHECAL PUMP: ICD-10-CM

## 2018-07-13 DIAGNOSIS — Z79.899 ENCOUNTER FOR LONG-TERM CURRENT USE OF HIGH RISK MEDICATION: ICD-10-CM

## 2018-07-13 DIAGNOSIS — G58.8 INTERCOSTAL NEURALGIA: ICD-10-CM

## 2018-07-13 DIAGNOSIS — M51.36 DEGENERATION OF INTERVERTEBRAL DISC OF LUMBAR REGION: ICD-10-CM

## 2018-07-13 PROCEDURE — 62369 ANAL SP INF PMP W/REPRG&FILL: CPT | Performed by: NURSE PRACTITIONER

## 2018-07-13 PROCEDURE — 99214 OFFICE O/P EST MOD 30 MIN: CPT | Performed by: NURSE PRACTITIONER

## 2018-07-13 NOTE — PROGRESS NOTES
CHIEF COMPLAINT  Since last pump refill,Pt is reportedly having some increased episodes of L sided LBP and L rib cage pain.    Johnie Geiger is a 72 y.o. male  who presents to the office for follow-up.He has a history of chronic back pain.    He presents today for IT pump refill.  Overall doing well with current dose of Prialt, has noticed some slight worsening of pain since getting outside on the riding .  He still uses Hydrocodone sparingly for breakthrough pain. Flector patches help with pain as well.    no side effects with current regimen.  He remains able to complete household tasks and work outdoors at his home.      Back Pain   This is a chronic problem. The current episode started more than 1 year ago. The problem occurs daily. The problem is unchanged. The pain is present in the lumbar spine. The quality of the pain is described as aching. The pain radiates to the left foot. The pain is at a severity of 6/10. The pain is the same all the time. Exacerbated by: cold weather, prolonged walking. Associated symptoms include numbness (L rib area). Pertinent negatives include no bladder incontinence, bowel incontinence, chest pain (radiating pain from back), dysuria, fever, headaches or weakness. Risk factors include lack of exercise and sedentary lifestyle. He has tried analgesics (IT pump with Prialt, Lindsey) for the symptoms. The treatment provided significant relief.      PEG Assessment   What number best describes your pain on average in the past week?6  What number best describes how, during the past week, pain has interfered with your enjoyment of life?5  What number best describes how, during the past week, pain has interfered with your general activity?  6    The following portions of the patient's history were reviewed and updated as appropriate: allergies, current medications, past family history, past medical history, past social history, past surgical history and problem  "list.    Review of Systems   Constitutional: Positive for activity change (increased). Negative for appetite change and fever.   Respiratory: Positive for shortness of breath (upon exertion). Negative for chest tightness.    Cardiovascular: Negative for chest pain (radiating pain from back).   Gastrointestinal: Negative for bowel incontinence, constipation, diarrhea and nausea.   Genitourinary: Negative for bladder incontinence, difficulty urinating and dysuria.   Musculoskeletal: Positive for back pain (L leg pain as well).   Skin: Negative for color change.   Neurological: Positive for numbness (L rib area). Negative for dizziness, weakness and headaches.   Psychiatric/Behavioral: Negative for agitation, confusion (memory deficit), sleep disturbance and suicidal ideas. The patient is not nervous/anxious.        Vitals:    07/13/18 1310   BP: 117/80   Pulse: 82   Resp: 16   Temp: 98.6 °F (37 °C)   SpO2: 94%   Weight: 103 kg (228 lb)   Height: 180.3 cm (70.98\")   PainSc: 6  Comment: L sided LBP ranges from 5-8/10   PainLoc: Back       Objective   Physical Exam   Constitutional: He is oriented to person, place, and time. He appears well-developed and well-nourished.   HENT:   Head: Normocephalic.   Eyes: Conjunctivae are normal.   Cardiovascular: Normal rate.    Pulmonary/Chest: Effort normal.   Musculoskeletal:        Thoracic back: He exhibits tenderness and pain.   Neurological: He is alert and oriented to person, place, and time.   Skin: Skin is warm and dry.   Psychiatric: He has a normal mood and affect. His behavior is normal.   Nursing note and vitals reviewed.    Intrathecal pump was interrogated in office today. Results are in chart.    The patient is currently at a dose of 8mcg of Prialt per day.     Under sterile technique, the pump was accessed using a [email or web address] proprietary refill kit. The volume was withdrawn from the pump. The expected residual volume of the pump was 2.2 ml. The actual residual volume " of the pump was 3.8 ml.    The pump was then refilled with 20 ml of Prialt at a concentration of 25 mcg/ml.   Patient tolerated procedure well. Minimal bleeding was noted. Pump site remains intact with no evidence of erythema or drainage.    Patient will return for pump refill when due or sooner if needed.     YANIRA is 29 months.      Assessment/Plan   Tay was seen today for back pain.    Diagnoses and all orders for this visit:    Other chronic pain    Degeneration of intervertebral disc of lumbar region    Intercostal neuralgia    Presence of intrathecal pump    Encounter for long-term current use of high risk medication    Other orders  -     diclofenac (FLECTOR) 1.3 % patch patch; Apply 1 patch topically 2 (Two) Times a Day.      --- Prialt increased to 8.5 mcg/day   --- Continue Hydrocodone PRN. Refill not needed today. Patient appears stable with current regimen. No adverse effects. Regarding continuation of opioids, there is no evidence of aberrant behavior or any red flags.  The patient continues with appropriate response to opioid therapy. ADL's remain intact by self.   --- Routine odt in office today as part of monitoring requirements for controlled substances.  This specimen will be sent to [employer] laboratory for confirmation.     --- Follow-up next pump fill         COURTNEY REPORT    As part of the patient's treatment plan, I am prescribing controlled substances. The patient has been made aware of appropriate use of such medications, including potential risk of somnolence, limited ability to drive and/or work safely, and the potential for dependence or overdose. It has also bee made clear that these medications are for use by this patient only, without concomitant use of alcohol or other substances unless prescribed.     Patient has completed prescribing agreement detailing terms of continued prescribing of controlled substances, including monitoring COURTNEY reports, urine drug screening, and pill  counts if necessary. The patient is aware that inappropriate use will results in cessation of prescribing such medications.    COURTNEY report has been reviewed and scanned into the patient's chart.    As the clinician, I personally reviewed the COURTNEY from 7/12/2018 while the patient was in the office today.    History and physical exam exhibit continued safe and appropriate use of controlled substances.    EMR Dragon/Transcription disclaimer:   Much of this encounter note is an electronic transcription/translation of spoken language to printed text. The electronic translation of spoken language may permit erroneous, or at times, nonsensical words or phrases to be inadvertently transcribed; Although I have reviewed the note for such errors, some may still exist.

## 2018-08-09 ENCOUNTER — TELEPHONE (OUTPATIENT)
Dept: PAIN MEDICINE | Facility: CLINIC | Age: 73
End: 2018-08-09

## 2018-08-09 NOTE — TELEPHONE ENCOUNTER
We were informed by Pt's wife that Tay Geiger passed away on 18,reportedly due to pneumonia,CHF and Renal failure.  Pt is to be cremated on 18 according to Gustavo,   of [facility] in [facility].  Mr Aj was informed by [employer] today that the pump must be explanted prior to cremation because the pump could explode during cremation.   Mr Aj informed me the  home will have the pump explanted prior to cremation.